# Patient Record
Sex: FEMALE | Race: BLACK OR AFRICAN AMERICAN | NOT HISPANIC OR LATINO | URBAN - METROPOLITAN AREA
[De-identification: names, ages, dates, MRNs, and addresses within clinical notes are randomized per-mention and may not be internally consistent; named-entity substitution may affect disease eponyms.]

---

## 2019-03-10 ENCOUNTER — EMERGENCY (EMERGENCY)
Facility: HOSPITAL | Age: 49
LOS: 1 days | Discharge: HOME | End: 2019-03-10
Attending: EMERGENCY MEDICINE | Admitting: EMERGENCY MEDICINE
Payer: COMMERCIAL

## 2019-03-10 VITALS
DIASTOLIC BLOOD PRESSURE: 85 MMHG | SYSTOLIC BLOOD PRESSURE: 144 MMHG | OXYGEN SATURATION: 100 % | RESPIRATION RATE: 16 BRPM | TEMPERATURE: 98 F | HEART RATE: 70 BPM

## 2019-03-10 DIAGNOSIS — Z98.891 HISTORY OF UTERINE SCAR FROM PREVIOUS SURGERY: Chronic | ICD-10-CM

## 2019-03-10 LAB
ALBUMIN SERPL ELPH-MCNC: 4.7 G/DL — SIGNIFICANT CHANGE UP (ref 3.3–5)
ALP SERPL-CCNC: 59 U/L — SIGNIFICANT CHANGE UP (ref 40–120)
ALT FLD-CCNC: 13 U/L — SIGNIFICANT CHANGE UP (ref 4–33)
ANION GAP SERPL CALC-SCNC: 11 MMO/L — SIGNIFICANT CHANGE UP (ref 7–14)
AST SERPL-CCNC: 18 U/L — SIGNIFICANT CHANGE UP (ref 4–32)
BASOPHILS # BLD AUTO: 0.02 K/UL — SIGNIFICANT CHANGE UP (ref 0–0.2)
BASOPHILS NFR BLD AUTO: 0.2 % — SIGNIFICANT CHANGE UP (ref 0–2)
BILIRUB SERPL-MCNC: 0.5 MG/DL — SIGNIFICANT CHANGE UP (ref 0.2–1.2)
BUN SERPL-MCNC: 6 MG/DL — LOW (ref 7–23)
CALCIUM SERPL-MCNC: 10.1 MG/DL — SIGNIFICANT CHANGE UP (ref 8.4–10.5)
CHLORIDE SERPL-SCNC: 104 MMOL/L — SIGNIFICANT CHANGE UP (ref 98–107)
CO2 SERPL-SCNC: 26 MMOL/L — SIGNIFICANT CHANGE UP (ref 22–31)
CREAT SERPL-MCNC: 0.65 MG/DL — SIGNIFICANT CHANGE UP (ref 0.5–1.3)
EOSINOPHIL # BLD AUTO: 0.15 K/UL — SIGNIFICANT CHANGE UP (ref 0–0.5)
EOSINOPHIL NFR BLD AUTO: 1.5 % — SIGNIFICANT CHANGE UP (ref 0–6)
GLUCOSE SERPL-MCNC: 92 MG/DL — SIGNIFICANT CHANGE UP (ref 70–99)
HCT VFR BLD CALC: 41.8 % — SIGNIFICANT CHANGE UP (ref 34.5–45)
HGB BLD-MCNC: 14 G/DL — SIGNIFICANT CHANGE UP (ref 11.5–15.5)
IMM GRANULOCYTES NFR BLD AUTO: 0.3 % — SIGNIFICANT CHANGE UP (ref 0–1.5)
LIDOCAIN IGE QN: 29.6 U/L — SIGNIFICANT CHANGE UP (ref 7–60)
LYMPHOCYTES # BLD AUTO: 1.96 K/UL — SIGNIFICANT CHANGE UP (ref 1–3.3)
LYMPHOCYTES # BLD AUTO: 19.8 % — SIGNIFICANT CHANGE UP (ref 13–44)
MCHC RBC-ENTMCNC: 27.5 PG — SIGNIFICANT CHANGE UP (ref 27–34)
MCHC RBC-ENTMCNC: 33.5 % — SIGNIFICANT CHANGE UP (ref 32–36)
MCV RBC AUTO: 82 FL — SIGNIFICANT CHANGE UP (ref 80–100)
MONOCYTES # BLD AUTO: 0.64 K/UL — SIGNIFICANT CHANGE UP (ref 0–0.9)
MONOCYTES NFR BLD AUTO: 6.5 % — SIGNIFICANT CHANGE UP (ref 2–14)
NEUTROPHILS # BLD AUTO: 7.11 K/UL — SIGNIFICANT CHANGE UP (ref 1.8–7.4)
NEUTROPHILS NFR BLD AUTO: 71.7 % — SIGNIFICANT CHANGE UP (ref 43–77)
NRBC # FLD: 0 K/UL — LOW (ref 25–125)
PLATELET # BLD AUTO: 253 K/UL — SIGNIFICANT CHANGE UP (ref 150–400)
PMV BLD: 10.7 FL — SIGNIFICANT CHANGE UP (ref 7–13)
POTASSIUM SERPL-MCNC: 4 MMOL/L — SIGNIFICANT CHANGE UP (ref 3.5–5.3)
POTASSIUM SERPL-SCNC: 4 MMOL/L — SIGNIFICANT CHANGE UP (ref 3.5–5.3)
PROT SERPL-MCNC: 8.7 G/DL — HIGH (ref 6–8.3)
RBC # BLD: 5.1 M/UL — SIGNIFICANT CHANGE UP (ref 3.8–5.2)
RBC # FLD: 13.1 % — SIGNIFICANT CHANGE UP (ref 10.3–14.5)
SODIUM SERPL-SCNC: 141 MMOL/L — SIGNIFICANT CHANGE UP (ref 135–145)
WBC # BLD: 9.91 K/UL — SIGNIFICANT CHANGE UP (ref 3.8–10.5)
WBC # FLD AUTO: 9.91 K/UL — SIGNIFICANT CHANGE UP (ref 3.8–10.5)

## 2019-03-10 PROCEDURE — 99284 EMERGENCY DEPT VISIT MOD MDM: CPT | Mod: 25

## 2019-03-10 PROCEDURE — 74177 CT ABD & PELVIS W/CONTRAST: CPT | Mod: 26

## 2019-03-10 NOTE — ED PROVIDER NOTE - NSFOLLOWUPCLINICS_GEN_ALL_ED_FT
[facility] Gastroenterology  Gastroenterology  [address] [place] [id number]  Phone: [phone]  Fax:   Follow Up Time:

## 2019-08-01 ENCOUNTER — TRANSCRIPTION ENCOUNTER (OUTPATIENT)
Age: 49
End: 2019-08-01

## 2020-03-09 ENCOUNTER — INPATIENT (INPATIENT)
Facility: HOSPITAL | Age: 50
LOS: 0 days | Discharge: HOME | End: 2020-03-10
Attending: INTERNAL MEDICINE | Admitting: INTERNAL MEDICINE
Payer: COMMERCIAL

## 2020-03-09 VITALS
DIASTOLIC BLOOD PRESSURE: 107 MMHG | OXYGEN SATURATION: 100 % | RESPIRATION RATE: 18 BRPM | HEART RATE: 104 BPM | SYSTOLIC BLOOD PRESSURE: 161 MMHG

## 2020-03-09 DIAGNOSIS — R41.82 ALTERED MENTAL STATUS, UNSPECIFIED: ICD-10-CM

## 2020-03-09 DIAGNOSIS — Z65.8 OTHER SPECIFIED PROBLEMS RELATED TO PSYCHOSOCIAL CIRCUMSTANCES: ICD-10-CM

## 2020-03-09 DIAGNOSIS — Z98.891 HISTORY OF UTERINE SCAR FROM PREVIOUS SURGERY: Chronic | ICD-10-CM

## 2020-03-09 DIAGNOSIS — Z29.9 ENCOUNTER FOR PROPHYLACTIC MEASURES, UNSPECIFIED: ICD-10-CM

## 2020-03-09 LAB
ALBUMIN SERPL ELPH-MCNC: 4.7 G/DL — SIGNIFICANT CHANGE UP (ref 3.3–5)
ALP SERPL-CCNC: 43 U/L — SIGNIFICANT CHANGE UP (ref 40–120)
ALT FLD-CCNC: 11 U/L — SIGNIFICANT CHANGE UP (ref 4–33)
ANION GAP SERPL CALC-SCNC: 17 MMO/L — HIGH (ref 7–14)
APAP SERPL-MCNC: < 15 UG/ML — LOW (ref 15–25)
APPEARANCE UR: CLEAR — SIGNIFICANT CHANGE UP
APTT BLD: 23.2 SEC — LOW (ref 27.5–36.3)
AST SERPL-CCNC: 18 U/L — SIGNIFICANT CHANGE UP (ref 4–32)
B PERT DNA SPEC QL NAA+PROBE: NOT DETECTED — SIGNIFICANT CHANGE UP
BACTERIA # UR AUTO: NEGATIVE — SIGNIFICANT CHANGE UP
BASE EXCESS BLDV CALC-SCNC: -0.3 MMOL/L — SIGNIFICANT CHANGE UP
BASOPHILS # BLD AUTO: 0.03 K/UL — SIGNIFICANT CHANGE UP (ref 0–0.2)
BASOPHILS NFR BLD AUTO: 0.3 % — SIGNIFICANT CHANGE UP (ref 0–2)
BILIRUB SERPL-MCNC: 1.1 MG/DL — SIGNIFICANT CHANGE UP (ref 0.2–1.2)
BILIRUB UR-MCNC: NEGATIVE — SIGNIFICANT CHANGE UP
BLOOD GAS VENOUS - CREATININE: 0.82 MG/DL — SIGNIFICANT CHANGE UP (ref 0.5–1.3)
BLOOD GAS VENOUS - FIO2: 21 — SIGNIFICANT CHANGE UP
BLOOD UR QL VISUAL: NEGATIVE — SIGNIFICANT CHANGE UP
BUN SERPL-MCNC: 26 MG/DL — HIGH (ref 7–23)
C PNEUM DNA SPEC QL NAA+PROBE: NOT DETECTED — SIGNIFICANT CHANGE UP
CALCIUM SERPL-MCNC: 10.4 MG/DL — SIGNIFICANT CHANGE UP (ref 8.4–10.5)
CHLORIDE BLDV-SCNC: 109 MMOL/L — HIGH (ref 96–108)
CHLORIDE SERPL-SCNC: 105 MMOL/L — SIGNIFICANT CHANGE UP (ref 98–107)
CO2 SERPL-SCNC: 21 MMOL/L — LOW (ref 22–31)
COLOR SPEC: YELLOW — SIGNIFICANT CHANGE UP
CREAT SERPL-MCNC: 0.71 MG/DL — SIGNIFICANT CHANGE UP (ref 0.5–1.3)
EOSINOPHIL # BLD AUTO: 0.02 K/UL — SIGNIFICANT CHANGE UP (ref 0–0.5)
EOSINOPHIL NFR BLD AUTO: 0.2 % — SIGNIFICANT CHANGE UP (ref 0–6)
ETHANOL BLD-MCNC: < 10 MG/DL — SIGNIFICANT CHANGE UP
FLUAV H1 2009 PAND RNA SPEC QL NAA+PROBE: NOT DETECTED — SIGNIFICANT CHANGE UP
FLUAV H1 RNA SPEC QL NAA+PROBE: NOT DETECTED — SIGNIFICANT CHANGE UP
FLUAV H3 RNA SPEC QL NAA+PROBE: NOT DETECTED — SIGNIFICANT CHANGE UP
FLUAV SUBTYP SPEC NAA+PROBE: NOT DETECTED — SIGNIFICANT CHANGE UP
FLUBV RNA SPEC QL NAA+PROBE: NOT DETECTED — SIGNIFICANT CHANGE UP
GAS PNL BLDV: 148 MMOL/L — HIGH (ref 136–146)
GLUCOSE BLDC GLUCOMTR-MCNC: 181 MG/DL — HIGH (ref 70–99)
GLUCOSE BLDC GLUCOMTR-MCNC: 94 MG/DL — SIGNIFICANT CHANGE UP (ref 70–99)
GLUCOSE BLDV-MCNC: 200 MG/DL — HIGH (ref 70–99)
GLUCOSE SERPL-MCNC: 208 MG/DL — HIGH (ref 70–99)
GLUCOSE UR-MCNC: 500 — HIGH
HADV DNA SPEC QL NAA+PROBE: NOT DETECTED — SIGNIFICANT CHANGE UP
HCG UR-SCNC: NEGATIVE — SIGNIFICANT CHANGE UP
HCO3 BLDV-SCNC: 23 MMOL/L — SIGNIFICANT CHANGE UP (ref 20–27)
HCOV PNL SPEC NAA+PROBE: SIGNIFICANT CHANGE UP
HCT VFR BLD CALC: 41.2 % — SIGNIFICANT CHANGE UP (ref 34.5–45)
HCT VFR BLDV CALC: 44.2 % — SIGNIFICANT CHANGE UP (ref 34.5–45)
HGB BLD-MCNC: 13.7 G/DL — SIGNIFICANT CHANGE UP (ref 11.5–15.5)
HGB BLDV-MCNC: 14.4 G/DL — SIGNIFICANT CHANGE UP (ref 11.5–15.5)
HMPV RNA SPEC QL NAA+PROBE: NOT DETECTED — SIGNIFICANT CHANGE UP
HPIV1 RNA SPEC QL NAA+PROBE: NOT DETECTED — SIGNIFICANT CHANGE UP
HPIV2 RNA SPEC QL NAA+PROBE: NOT DETECTED — SIGNIFICANT CHANGE UP
HPIV3 RNA SPEC QL NAA+PROBE: NOT DETECTED — SIGNIFICANT CHANGE UP
HPIV4 RNA SPEC QL NAA+PROBE: NOT DETECTED — SIGNIFICANT CHANGE UP
HYALINE CASTS # UR AUTO: NEGATIVE — SIGNIFICANT CHANGE UP
IMM GRANULOCYTES NFR BLD AUTO: 0.2 % — SIGNIFICANT CHANGE UP (ref 0–1.5)
INR BLD: 1.19 — HIGH (ref 0.88–1.17)
KETONES UR-MCNC: HIGH
LACTATE BLDV-MCNC: 1.6 MMOL/L — SIGNIFICANT CHANGE UP (ref 0.5–2)
LEUKOCYTE ESTERASE UR-ACNC: NEGATIVE — SIGNIFICANT CHANGE UP
LYMPHOCYTES # BLD AUTO: 1.04 K/UL — SIGNIFICANT CHANGE UP (ref 1–3.3)
LYMPHOCYTES # BLD AUTO: 12.1 % — LOW (ref 13–44)
MAGNESIUM SERPL-MCNC: 2.2 MG/DL — SIGNIFICANT CHANGE UP (ref 1.6–2.6)
MCHC RBC-ENTMCNC: 27.3 PG — SIGNIFICANT CHANGE UP (ref 27–34)
MCHC RBC-ENTMCNC: 33.3 % — SIGNIFICANT CHANGE UP (ref 32–36)
MCV RBC AUTO: 82.1 FL — SIGNIFICANT CHANGE UP (ref 80–100)
MONOCYTES # BLD AUTO: 0.57 K/UL — SIGNIFICANT CHANGE UP (ref 0–0.9)
MONOCYTES NFR BLD AUTO: 6.6 % — SIGNIFICANT CHANGE UP (ref 2–14)
NEUTROPHILS # BLD AUTO: 6.92 K/UL — SIGNIFICANT CHANGE UP (ref 1.8–7.4)
NEUTROPHILS NFR BLD AUTO: 80.6 % — HIGH (ref 43–77)
NITRITE UR-MCNC: NEGATIVE — SIGNIFICANT CHANGE UP
NRBC # FLD: 0 K/UL — SIGNIFICANT CHANGE UP (ref 0–0)
PCO2 BLDV: 42 MMHG — SIGNIFICANT CHANGE UP (ref 41–51)
PH BLDV: 7.38 PH — SIGNIFICANT CHANGE UP (ref 7.32–7.43)
PH UR: 6 — SIGNIFICANT CHANGE UP (ref 5–8)
PLATELET # BLD AUTO: 244 K/UL — SIGNIFICANT CHANGE UP (ref 150–400)
PMV BLD: 10.9 FL — SIGNIFICANT CHANGE UP (ref 7–13)
PO2 BLDV: 33 MMHG — LOW (ref 35–40)
POTASSIUM BLDV-SCNC: 3.3 MMOL/L — LOW (ref 3.4–4.5)
POTASSIUM SERPL-MCNC: 3.4 MMOL/L — LOW (ref 3.5–5.3)
POTASSIUM SERPL-SCNC: 3.4 MMOL/L — LOW (ref 3.5–5.3)
PROT SERPL-MCNC: 9.3 G/DL — HIGH (ref 6–8.3)
PROT UR-MCNC: 50 — SIGNIFICANT CHANGE UP
PROTHROM AB SERPL-ACNC: 13.6 SEC — HIGH (ref 9.8–13.1)
RBC # BLD: 5.02 M/UL — SIGNIFICANT CHANGE UP (ref 3.8–5.2)
RBC # FLD: 13.1 % — SIGNIFICANT CHANGE UP (ref 10.3–14.5)
RBC CASTS # UR COMP ASSIST: SIGNIFICANT CHANGE UP (ref 0–?)
RSV RNA SPEC QL NAA+PROBE: NOT DETECTED — SIGNIFICANT CHANGE UP
RV+EV RNA SPEC QL NAA+PROBE: NOT DETECTED — SIGNIFICANT CHANGE UP
SALICYLATES SERPL-MCNC: < 5 MG/DL — LOW (ref 15–30)
SAO2 % BLDV: 58.5 % — LOW (ref 60–85)
SODIUM SERPL-SCNC: 143 MMOL/L — SIGNIFICANT CHANGE UP (ref 135–145)
SP GR SPEC: 1.02 — SIGNIFICANT CHANGE UP (ref 1–1.04)
SQUAMOUS # UR AUTO: SIGNIFICANT CHANGE UP
TROPONIN T, HIGH SENSITIVITY: 6 NG/L — SIGNIFICANT CHANGE UP (ref ?–14)
TSH SERPL-MCNC: 0.95 UIU/ML — SIGNIFICANT CHANGE UP (ref 0.27–4.2)
UROBILINOGEN FLD QL: NORMAL — SIGNIFICANT CHANGE UP
WBC # BLD: 8.6 K/UL — SIGNIFICANT CHANGE UP (ref 3.8–10.5)
WBC # FLD AUTO: 8.6 K/UL — SIGNIFICANT CHANGE UP (ref 3.8–10.5)
WBC UR QL: SIGNIFICANT CHANGE UP (ref 0–?)

## 2020-03-09 PROCEDURE — 99223 1ST HOSP IP/OBS HIGH 75: CPT | Mod: GC

## 2020-03-09 PROCEDURE — 71045 X-RAY EXAM CHEST 1 VIEW: CPT | Mod: 26

## 2020-03-09 PROCEDURE — 70450 CT HEAD/BRAIN W/O DYE: CPT | Mod: 26

## 2020-03-09 PROCEDURE — 99222 1ST HOSP IP/OBS MODERATE 55: CPT

## 2020-03-09 RX ORDER — DEXTROSE 50 % IN WATER 50 %
50 SYRINGE (ML) INTRAVENOUS ONCE
Refills: 0 | Status: COMPLETED | OUTPATIENT
Start: 2020-03-09 | End: 2020-03-09

## 2020-03-09 RX ADMIN — Medication 50 MILLILITER(S): at 20:40

## 2020-03-09 RX ADMIN — Medication 1 MILLIGRAM(S): at 19:15
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
## 2020-03-09 NOTE — ED PROVIDER NOTE - OBJECTIVE STATEMENT
49 y F no stated pm  pw altered mental status  was noted by family no be unresponsive this am around 0930  they tried to arrouse her but she did not respond  ems was later called in the early afternoon  found to have bgm 62, which responded to d50  however, AMS did not improve  no history of fever or substance abuse as per family  unable to obtain history from patient

## 2020-03-09 NOTE — H&P ADULT - ATTENDING COMMENTS
49W no significant PMHx p/w sudden unresponsiveness. CTH without acute pathology. Labs without significant abnormalities. On exam, she resists opening her eyelids. When arm is raised over her head, she maintains her arm in the air. Suspect conversion disorder vs catatonia. Neuro recs reviewed. Will obtain MRI brain, MRA head and neck, LP. Will also trial IV Ativan.    I examined this patient and discussed their management with house staff on 3/9/2020.

## 2020-03-09 NOTE — ED ADULT TRIAGE NOTE - CHIEF COMPLAINT QUOTE
pt new onset AMS at 9am. f/s in field 62, given 1amp D50 by EMS. pt currently lethargic, responds to sternal rub only.

## 2020-03-09 NOTE — H&P ADULT - NSHPPHYSICALEXAM_GEN_ALL_CORE
GENERAL: NAD, lying in bed comfortably  HEAD:  Atraumatic, Normocephalic  EYES: EOMI, PERRLA, conjunctiva and sclera clear  ENT: Moist mucous membranes  NECK: Supple, No JVD  CHEST/LUNG: CTA YG   HEART: Regular rate and rhythm; No murmurs, rubs, or gallops  ABDOMEN: Bowel sounds present; Soft, Nontender, Nondistended. No hepatomegally  EXTREMITIES:  2+ Peripheral Pulses, brisk capillary refill. No clubbing, cyanosis, or edema  NERVOUS SYSTEM:  unable to assess mental status, patient non responsive    MSK: patient with normal muscle tone, no rigidity, clonus, unable to assess strength  SKIN: No rashes or lesions

## 2020-03-09 NOTE — H&P ADULT - ASSESSMENT
49F no significant PMHx p/w altered mental status, minimally responsive since 3/9/20 AM, with stable vital signs, labs within normal limits, negative CT head, admitted for further observation and evaluation for evaluation of encephalopathy vs. psychogenic etiology of AMS.

## 2020-03-09 NOTE — ED PROVIDER NOTE - ATTENDING CONTRIBUTION TO CARE
49F presents with altered mental status. This morning the patient was found in the basement by her mom, tried to wake her without success. By family report, she was at her normal baseline around 7am, then noted to be altered at 9am. Per EMS, arousable to sternal rub. FSBG 61, given D50 without a change in mental status. Patient not able to give a hx. Per sister, patient has no medical history, does not take any meds, no psychiatric hx, does not drink or use drugs. No prior h/o similar episode. On exam well appearing, nad, resists eye opening, mmm, rrr, lungs CTA b/l, abd soft NT/ND, 2+ pulses, no edema, no rash, alert, MAEE, not speaking, follows simple commands and nods head in response to some question.

## 2020-03-09 NOTE — H&P ADULT - PROBLEM SELECTOR PLAN 1
Patient with sudden change in mental status concerning for infectious vs metabolic vs psychogenic etiology.   -f/u MRA head, MRA neck   -f/u LP   -f/u utox, folate, B12  -neuro consulted, recs appreciated  -routine EEG  -psych consult in AM   -q4 neurochecks Patient with sudden change in mental status concerning for infectious vs metabolic vs psychogenic etiology.   -f/u MRA head, MRA neck   -f/u LP   -f/u utox, folate, B12  -neuro consulted, recs appreciated  -routine EEG  -psych consult in AM   -q4 neurochecks  -trial of IV ativan for possible catatonia given waxy flexibility in setting of home stressor

## 2020-03-09 NOTE — CONSULT NOTE ADULT - SUBJECTIVE AND OBJECTIVE BOX
HPI:  Patient is a 49 year old female with no significant PMHx who presents to the ED for AMS.     PAST MEDICAL & SURGICAL HISTORY:  No pertinent past medical history  History of     General:  Constitutional: Obese Female, appears stated age, in no apparent distress including pain  Head: Normocephalic & atraumatic.    Neurological (>12):  MS: Awake, alert, oriented to person, place, situation, time. Normal affect. Follows all commands.    Language: Speech is clear, fluent with good repetition & comprehension (able to name objects___)    CNs: PERRLA, VFF. EOMI no nystagmus, no diplopia. V1-3 intact to LT. No facial asymmetry b/l, full eye closure strength b/l. Hearing grossly normal. Head turning & shoulder shrug intact b/l. Tongue midline    Motor: Normal muscle bulk & tone. No noticeable tremor or seizure. No pronator drift.              Deltoid	Biceps	Triceps	   R	5	5	5	5	  L	5	5	5	5	    	H-Flex	H-Ext	K-Flex	K-Ext	D-Flex	P-Flex  R	5	5	5	5	5	5		   L	5	5	5	5	5	5	     Sensation: Intact to LT/PP/Temp/Vibration/Position b/l throughout.     Cortical: Extinction on DSS (neglect): none    Reflexes:              Biceps(C5)       BR(C6)     Triceps(C7)               Patellar(L4)    Achilles(S1)    Plantar Resp  R	2	          2	             2		        2		    2		Down   L	2	          2	             2		        2		    2		Down     Coordination: intact rapid-alt movements. No dysmetria to FTN/HTS  Gait: Normal Romberg. No postural instability. Normal stance and tandem gait.     LABORATORY:  CBC                       13.7   8.60  )-----------( 244      ( 09 Mar 2020 14:45 )             41.2     Chem     143  |  105  |  26<H>  ----------------------------<  208<H>  3.4<L>   |  21<L>  |  0.71    Ca    10.4      09 Mar 2020 14:45  Mg     2.2         TPro  9.3<H>  /  Alb  4.7  /  TBili  1.1  /  DBili  x   /  AST  18  /  ALT  11  /  AlkPhos  43      LFTs LIVER FUNCTIONS - ( 09 Mar 2020 14:45 )  Alb: 4.7 g/dL / Pro: 9.3 g/dL / ALK PHOS: 43 u/L / ALT: 11 u/L / AST: 18 u/L / GGT: x HPI:  Patient is a 49 year old female with no significant PMHx who presents to the ED for AMS. History obtained from family at bedside since patient is having waxing and waning mentation (currently is mute, however 10-15 min prior to eval was speaking and AOx3). She was last seen well on the AM of presentation, and at approx 9:30 am she was found in the living room sitting on the couch, with her eyes closed and was not responsive. She came to after several minutes (opened eyes) and family called EMS. POCT in the field was 62, and she got d50 and fingerstick improved. She has no history of anything like this occurring, and at her baseline is functional, ambulatory, and does all iADLs. She has no history of substance abuse, no new medications, no stressful events per the family, no history of seizures. No history of recent illnesses or travel, no head trauma. Per ED she has said earlier that she can't remember events from past 2 days but "something happened". CT head in ED unremarkable, CBC, CMP unremarkable. TSH wnl 0.95. RVP and blood cx x2 pending. LP pending.    PAST MEDICAL & SURGICAL HISTORY:  No pertinent past medical history  History of     General:  Constitutional: Female, appears stated age, in no apparent distress including pain  Head: Normocephalic & atraumatic.    Neurological (>12):  MS: Awake, lethargic but easily arousable, able to open eyes but has flat affect, appears catatonic. Able to follow commands but has slowed movement throughout, able to follow cross commands, (touch L ear with R hand and stick out tongue), able to show 2 fingers. Unable to say name, where she is or what the year is.  Neck is supple, no nuchal rigidity.   Language: no notable verbal output, mumbled 1-2 incoherent words.     CNs: PERRLA, unable to assess VF, unable to assess for lid lag (patient closes eyes after few seconds) EOM grossly intact, no nystagmus, sensation intact (nods yes), No facial asymmetry. Tongue midline    Motor: Normal muscle bulk & tone. No noticeable tremor. No pronator drift. Strength limited - likely effort dependent.               Deltoid	Biceps	Triceps	   R	4	4	4	4	  L	4	4	4	4	    	H-Flex	H-Ext	K-Flex	K-Ext	D-Flex	P-Flex  R	4+	4+	4+	4+	5	5		   L	4+	4+	4+	4+	5	5	     Sensation: Intact to LT throughout.   Cortical: Extinction on DSS (neglect): cannot assess    Reflexes:              Biceps(C5)       BR(C6)     Triceps(C7)               Patellar(L4)    Achilles(S1)    Plantar Resp  R	2	          2	             2		        2		    2		mute  L	2	          2	             2		        2		    2		mute     Coordination: no dysmetria  Gait: able to walk with assistance, takes small cautious steps.     LABORATORY:  CBC                       13.7   8.60  )-----------( 244      ( 09 Mar 2020 14:45 )             41.2     Chem 03-    143  |  105  |  26<H>  ----------------------------<  208<H>  3.4<L>   |  21<L>  |  0.71    Ca    10.4      09 Mar 2020 14:45  Mg     2.2     -    TPro  9.3<H>  /  Alb  4.7  /  TBili  1.1  /  DBili  x   /  AST  18  /  ALT  11  /  AlkPhos  43  03-09    LFTs LIVER FUNCTIONS - ( 09 Mar 2020 14:45 )  Alb: 4.7 g/dL / Pro: 9.3 g/dL / ALK PHOS: 43 u/L / ALT: 11 u/L / AST: 18 u/L / GGT: x

## 2020-03-09 NOTE — ED PROVIDER NOTE - PROGRESS NOTE DETAILS
sign out:  -infectious/tox workup  -neuro imaging; rule out hypothyroid; will require admission for ams; rectal temp, if febrile would cover with abx Khadar PGY3: signout received from Tox fellow MD pt with no PMH/past pysch hx with AMS today in setting of hypoglycemia, ams persisted after hypoglycemia resolved, was catatonic A&Ox0 however otherwise non-focal neuro exam per intial documented exam. CTH and labs and tox wnl. on initial assessment pt arousable to voice and sound however mumbling in response to stimuli not following commands. after returning from CTH pt awake alert A&Ox3. states she can't remember events from past 2 days but thinks "something happened" denies head trauma/drugs/alcohol/depression/anxiety/SI/HI/hallucinations. psych called recommended to consult neuro first. neuro consulted will see pt in ED Khadar PGY3: on reassessment pt awake and alert however not responding verbally to questions A&Ox0, neuro saw pt recommends admission for MRI and LP psych said pysch should be consulted inpatient once pt is medically cleared family still deliberating over LP will admit so far w/u wnl Khadar PGY3: spoke with family approximately 6 times since my arrival and initial assessment of pt. unable to reach pt's spouse - family doesn't know spouse's phone number and it's not in [place]. pt unable to give spouse's number. daughter present who is 18 y/o had said she wanted to speak to other family members before deciding on LP. on reassessment family still decidiing on LP or not Khadar BARNARD: other daughter now at bedside states family has still not made a decision regarding consent for LP

## 2020-03-09 NOTE — ED ADULT NURSE NOTE - OBJECTIVE STATEMENT
Pt received to room 1. Pt was brought in by EMS after found by family around 9am with AMS. on EMS arrival pts FS was 62. Pt was given 1 amp of D50. Pt lethargic, opens eyes when you ask questions but does not respond. Pt arrives with IV in L AC placed by EMS. As per family pt does not do drugs denies HX. Pt placed on cardiac monitor, IV access obtained, labs drawn and sent.

## 2020-03-09 NOTE — CONSULT NOTE ADULT - ATTENDING COMMENTS
I have personally seen, examined, and participated in the care of this patient on rounds 3/10/20 with the neurology team.  I have reviewed all pertinent clinical information, including history, physical examination, assessment and plan.   Hx as reported above.  Additional information: works as an ; no previous catatonic-like episodes or other psychiatric problems.      On examination I note in particular, or in addition to or instead of the above, as follows:    Pt awake, cheerful.  When asked what happened/why is she in hospital she responds that she thinks was reacting to stress but now feels fine.  Knows date, location, recent news items, names of major elected officials.  Responds to examination instructions quickly and accurately.  Normal comprehension, expression, prosody, speech articulation.  PERRL; EOMI; confrontation visual fields intact.  No UE drift.  April symmetric and intact four limbs.  Normal brisk gait, and walking on toes/heels. I have personally seen, examined, and participated in the care of this patient on rounds 3/10/20 with the neurology team.  I have reviewed all pertinent clinical information, including history, physical examination, assessment and plan.   Hx as reported above.  Additional information: works as an ; no previous catatonic-like episodes or other psychiatric problems.      On examination I note in particular, or in addition to or instead of the above, as follows:    Pt awake, cheerful.  When asked what happened/why is she in hospital she responds that she thinks was reacting to stress but now feels fine.  Knows date, location, recent news items, names of major elected officials.  Responds to examination instructions quickly and accurately.  Normal comprehension, expression, prosody, speech articulation.  PERRL; EOMI; confrontation visual fields intact.  No UE drift.  April symmetric and intact four limbs.  Normal brisk gait, and walking on toes/heels.      IMPRESSION    Acute stress reaction with catatonic-like symptoms, resolved.      Normal mentals status and general neurologic examination.      No further work-up needed from the neurologic point of view (including further CNS imaging studies).      Behavioral Medicine out-pt follow-up.

## 2020-03-09 NOTE — ED ADULT NURSE REASSESSMENT NOTE - NS ED NURSE REASSESS COMMENT FT1
Pt received from day RN. Pt opened eyes to name but did not respond to any other questions or open eyes to any other questions. D5 given by myself. Family at bedside. No distress noted. Will continue to monitor.
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
## 2020-03-09 NOTE — ED PROVIDER NOTE - CLINICAL SUMMARY MEDICAL DECISION MAKING FREE TEXT BOX
will ro infectious etiology including encephalitis/menigitis after ct head performed, if imaging study ammenable to LP. Furthermore, we will rule out organic neurologic cause of her ams, but she is outside of the window for TP and has no neuro findings consistent with a specific lesion. Finally, there is no evidence of an acute toxidrome, however sedative hypnotic may be a possibility

## 2020-03-09 NOTE — ED ADULT NURSE REASSESSMENT NOTE - NS ED NURSE REASSESS COMMENT FT1
Received report from EVANGELIST [place]. Upon initial encounter with pt , pt was alert but not answering questions. On arrival back to room to draw lab work, pt now communicating. When asked why she wasn't communicating prior she stated "I have a lot going on". During MD fiore pt denied SI/HI, A/V/H, drug or alcohol use. Will continue to monitor.

## 2020-03-09 NOTE — ED ADULT NURSE NOTE - NSIMPLEMENTINTERV_GEN_ALL_ED
Implemented All Fall Risk Interventions:  [place] to call system. Call bell, personal items and telephone within reach. Instruct patient to call for assistance. Room bathroom lighting operational. Non-slip footwear when patient is off stretcher. Physically safe environment: no spills, clutter or unnecessary equipment. Stretcher in lowest position, wheels locked, appropriate side rails in place. Provide visual cue, wrist band, yellow gown, etc. Monitor gait and stability. Monitor for mental status changes and reorient to person, place, and time. Review medications for side effects contributing to fall risk. Reinforce activity limits and safety measures with patient and family.
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
## 2020-03-09 NOTE — H&P ADULT - NSHPLABSRESULTS_GEN_ALL_CORE
LABS:                        13.7   8.60  )-----------( 244      ( 09 Mar 2020 14:45 )             41.2     03-09    143  |  105  |  26<H>  ----------------------------<  208<H>  3.4<L>   |  21<L>  |  0.71    Ca    10.4      09 Mar 2020 14:45  Mg     2.2     03-09    TPro  9.3<H>  /  Alb  4.7  /  TBili  1.1  /  DBili  x   /  AST  18  /  ALT  11  /  AlkPhos  43  03-09    PT/INR - ( 09 Mar 2020 16:39 )   PT: 13.6 SEC;   INR: 1.19          PTT - ( 09 Mar 2020 16:39 )  PTT:23.2 SEC

## 2020-03-09 NOTE — ED PROVIDER NOTE - PHYSICAL EXAMINATION
CONSTITUTIONAL: non-toxic, no acute distress  HEAD: Normocephalic; atraumatic,   EYES: EOM intact, pupils equal reactive  ENMT: External appears normal; normal oropharynx  NECK: Supple; non-tender; normal ROM  CARD: RRR, no MRG, no cyanosis   RESP: Normal breathing pattern,  normal respiratory rate, CTAB  ABD: Soft, non-distended; non-tender;   EXT: grossly normal ROM in all four extremities   SKIN: Warm, dry, no rash, not diaphoretic   NEURO: CN 2-12 grossly intact, Mental status AAOx0  moving all extrmeities  no hyperreflexia, no clonus

## 2020-03-09 NOTE — CONSULT NOTE ADULT - ASSESSMENT
pended 49 year old female who presents to the ED for AMS. History obtained from family at bedside since patient is having waxing and waning mentation (currently is mute, however 10-15 min prior to eval was speaking and AOx3). She was last seen well on the AM of presentation, and at approx 9:30 am she was found in the living room sitting on the couch, with her eyes closed and was not responsive. She came to after several minutes (opened eyes) and family called EMS. POCT in the field was 62, and she got d50 and fingerstick improved. She has no history of anything like this occurring, and at her baseline is functional, ambulatory, and does all iADLs. She has no history of substance abuse, no new medications, no stressful events per the family, no history of seizures. No history of recent illnesses or travel, no head trauma. Per ED she has said earlier that she can't remember events from past 2 days but "something happened". CT head in ED unremarkable, CBC, CMP unremarkable. TSH wnl 0.95. RVP and blood cx x2 pending. LP pending.  on neuro exam is she is drowsy but easily arousable, not speaking,     Impression: unclear etiology of sudden change in mentation - r/o infectious vs metabolic vs psychiatric etiology    Plan  [] LP in ED pending: send for cell count, protein, glucose, gram stain, culture, PCR panel, acid fast  [] MRI brain w, w/o contrast,   [] MRA head non con, MRA neck with contrast  [] labs: b12, folate, Utox, heavy metal screen  [] routine EEG  [] psych consult  [] further workup pending results of above studies 49 year old female who presents to the ED for AMS. History obtained from family at bedside since patient is having waxing and waning mentation (currently is mute, however 10-15 min prior to eval was speaking and AOx3). She was last seen well on the AM of presentation, and at approx 9:30 am she was found in the living room sitting on the couch, with her eyes closed and was not responsive. She came to after several minutes (opened eyes) and family called EMS. POCT in the field was 62, and she got d50 and fingerstick improved. She has no history of anything like this occurring, and at her baseline is functional, ambulatory, and does all iADLs. She has no history of substance abuse, no new medications, no stressful events per the family, no history of seizures. No history of recent illnesses or travel, no head trauma. Per ED she has said earlier that she can't remember events from past 2 days but "something happened". CT head in ED unremarkable, CBC, CMP unremarkable. TSH wnl 0.95. RVP and blood cx x2 pending. LP pending.  on neuro exam is she is drowsy but easily arousable, not speaking,     Impression: unclear etiology of sudden change in mentation - r/o infectious vs metabolic vs psychiatric etiology    Plan  [] LP in ED pending: send for cell count, protein, glucose, gram stain, culture, PCR panel, acid fast  [] MRI brain w, w/o contrast,   [] MRA head non con, MRA neck with contrast  [] labs: b12, folate, Utox, heavy metal screen  [] routine EEG  [] psych consult  [] EKG  [] further workup pending results of above studies  [] q4 neurochecks, monitor for worsening in neuro exam

## 2020-03-10 ENCOUNTER — TRANSCRIPTION ENCOUNTER (OUTPATIENT)
Age: 50
End: 2020-03-10

## 2020-03-10 VITALS
TEMPERATURE: 98 F | OXYGEN SATURATION: 99 % | HEART RATE: 98 BPM | RESPIRATION RATE: 18 BRPM | DIASTOLIC BLOOD PRESSURE: 76 MMHG | SYSTOLIC BLOOD PRESSURE: 122 MMHG

## 2020-03-10 LAB
ANION GAP SERPL CALC-SCNC: 17 MMO/L — HIGH (ref 7–14)
BUN SERPL-MCNC: 22 MG/DL — SIGNIFICANT CHANGE UP (ref 7–23)
CALCIUM SERPL-MCNC: 10 MG/DL — SIGNIFICANT CHANGE UP (ref 8.4–10.5)
CHLORIDE SERPL-SCNC: 102 MMOL/L — SIGNIFICANT CHANGE UP (ref 98–107)
CO2 SERPL-SCNC: 21 MMOL/L — LOW (ref 22–31)
CREAT SERPL-MCNC: 0.69 MG/DL — SIGNIFICANT CHANGE UP (ref 0.5–1.3)
FOLATE SERPL-MCNC: > 20 NG/ML — HIGH (ref 4.7–20)
GLUCOSE BLDC GLUCOMTR-MCNC: 88 MG/DL — SIGNIFICANT CHANGE UP (ref 70–99)
GLUCOSE SERPL-MCNC: 101 MG/DL — HIGH (ref 70–99)
HCT VFR BLD CALC: 40.6 % — SIGNIFICANT CHANGE UP (ref 34.5–45)
HGB BLD-MCNC: 13.3 G/DL — SIGNIFICANT CHANGE UP (ref 11.5–15.5)
MAGNESIUM SERPL-MCNC: 2.1 MG/DL — SIGNIFICANT CHANGE UP (ref 1.6–2.6)
MCHC RBC-ENTMCNC: 27.3 PG — SIGNIFICANT CHANGE UP (ref 27–34)
MCHC RBC-ENTMCNC: 32.8 % — SIGNIFICANT CHANGE UP (ref 32–36)
MCV RBC AUTO: 83.4 FL — SIGNIFICANT CHANGE UP (ref 80–100)
NRBC # FLD: 0 K/UL — SIGNIFICANT CHANGE UP (ref 0–0)
PHOSPHATE SERPL-MCNC: 3.7 MG/DL — SIGNIFICANT CHANGE UP (ref 2.5–4.5)
PLATELET # BLD AUTO: 231 K/UL — SIGNIFICANT CHANGE UP (ref 150–400)
PMV BLD: 11.1 FL — SIGNIFICANT CHANGE UP (ref 7–13)
POTASSIUM SERPL-MCNC: 3.1 MMOL/L — LOW (ref 3.5–5.3)
POTASSIUM SERPL-SCNC: 3.1 MMOL/L — LOW (ref 3.5–5.3)
RBC # BLD: 4.87 M/UL — SIGNIFICANT CHANGE UP (ref 3.8–5.2)
RBC # FLD: 13.2 % — SIGNIFICANT CHANGE UP (ref 10.3–14.5)
SODIUM SERPL-SCNC: 140 MMOL/L — SIGNIFICANT CHANGE UP (ref 135–145)
VIT B12 SERPL-MCNC: > 2000 PG/ML — HIGH (ref 200–900)
WBC # BLD: 9.84 K/UL — SIGNIFICANT CHANGE UP (ref 3.8–10.5)
WBC # FLD AUTO: 9.84 K/UL — SIGNIFICANT CHANGE UP (ref 3.8–10.5)

## 2020-03-10 PROCEDURE — 99239 HOSP IP/OBS DSCHRG MGMT >30: CPT | Mod: GC

## 2020-03-10 RX ORDER — DEXTROSE 50 % IN WATER 50 %
25 SYRINGE (ML) INTRAVENOUS ONCE
Refills: 0 | Status: DISCONTINUED | OUTPATIENT
Start: 2020-03-10 | End: 2020-03-10

## 2020-03-10 RX ORDER — DEXTROSE 50 % IN WATER 50 %
12.5 SYRINGE (ML) INTRAVENOUS ONCE
Refills: 0 | Status: DISCONTINUED | OUTPATIENT
Start: 2020-03-10 | End: 2020-03-10

## 2020-03-10 RX ORDER — SODIUM CHLORIDE 9 MG/ML
1000 INJECTION, SOLUTION INTRAVENOUS
Refills: 0 | Status: DISCONTINUED | OUTPATIENT
Start: 2020-03-10 | End: 2020-03-10

## 2020-03-10 RX ORDER — GLUCAGON INJECTION, SOLUTION 0.5 MG/.1ML
1 INJECTION, SOLUTION SUBCUTANEOUS ONCE
Refills: 0 | Status: DISCONTINUED | OUTPATIENT
Start: 2020-03-10 | End: 2020-03-10

## 2020-03-10 RX ORDER — INSULIN LISPRO 100/ML
VIAL (ML) SUBCUTANEOUS EVERY 6 HOURS
Refills: 0 | Status: DISCONTINUED | OUTPATIENT
Start: 2020-03-10 | End: 2020-03-10

## 2020-03-10 RX ORDER — DEXTROSE 50 % IN WATER 50 %
15 SYRINGE (ML) INTRAVENOUS ONCE
Refills: 0 | Status: DISCONTINUED | OUTPATIENT
Start: 2020-03-10 | End: 2020-03-10

## 2020-03-10 NOTE — DISCHARGE NOTE PROVIDER - NSFOLLOWUPCLINICS_GEN_ALL_ED_FT
[facility]  Psychiatry  [address] [place] [address]  Phone: [phone]  Fax:   Follow Up Time: Routine

## 2020-03-10 NOTE — DISCHARGE NOTE PROVIDER - NSDCCPCAREPLAN_GEN_ALL_CORE_FT
PRINCIPAL DISCHARGE DIAGNOSIS  Diagnosis: Altered mental status  Assessment and Plan of Treatment: You were found to have altered mental status, in which you were minimally responsive to verbal stimuli. Your vital signs were normal. Your symptoms spontaneously resolved. It is likely that this was a manifestation of stress. You were evaluated by neurology who did not see need for further workup. You should call your doctor or return to the ED if you have similar symptoms or feel otherwise unwell.      SECONDARY DISCHARGE DIAGNOSES  Diagnosis: Domestic problems  Assessment and Plan of Treatment: On interview, it appeared that some of your stress may stem from issues at home. You were given the name and number of a [facility] crisis center. please follow up with them or your primary care doctor as needed for further help with stress in your life. Please notify them or return to the ED if you have thoughts of harming yourself or others.

## 2020-03-10 NOTE — DISCHARGE NOTE PROVIDER - HOSPITAL COURSE
49F no known PMHx, p/w altered mental status, brought in by EMS. Per sister, patient was at her mother's house this morning; patient found to be sitting, unresponsive to verbal or physical stimulus, brought to [facility] ED by EMS. Patient intermittently responsive to sternal rub, intermittently responsive to questioning from family; however, marked paucity of words when responsive. In the ED, patient found to have blood glucose of 62, which was corrected with D50. /107, , RR 18, saturating 100% on RA. CT head negative for intracranial pathology.         Patient admitted to medicine with initial plans for further workup of altered mental status. Patient with spontaneous resolution of symptoms 3/10 am. Patient AAOx4, without focal neurological deficits. Patient states she is feeling her normal self and believes her altered mental status may have been as a result of stress in her life.         Further questioning revealed concerns about her relationship with . Patient denies domestic abuse.         Patient to f/u with her outpatient PMD. Patient given referral to [facility]. 49W no known PMHx, p/w altered mental status, brought in by EMS. Per sister, patient was at her mother's house this morning; patient found to be sitting, unresponsive to verbal or physical stimulus, brought to [facility] ED by EMS. Patient intermittently responsive to sternal rub, intermittently responsive to questioning from family; however, marked paucity of words when responsive. In the ED, patient found to have blood glucose of 62, which was corrected with D50. /107, , RR 18, saturating 100% on RA. CT head negative for intracranial pathology.         Patient admitted to medicine with initial plans for further workup of altered mental status. Patient with spontaneous resolution of symptoms 3/10 am. Patient AAOx4, without focal neurological deficits. Patient states she is feeling her normal self and believes her altered mental status may have been as a result of stress in her life. Concerning for potential catatonia.        Further questioning revealed concerns about her relationship with . Patient denies domestic abuse.         Patient to f/u with her outpatient PMD. Patient given referral to [facility].

## 2020-03-10 NOTE — PROGRESS NOTE ADULT - SUBJECTIVE AND OBJECTIVE BOX
[facility] Division of Hospital Medicine  Vic Damon MD PGY-1  Pager: [phone]      Patient is a 49y old  Female who presents with a chief complaint of AMS (09 Mar 2020 19:11)      SUBJECTIVE / OVERNIGHT EVENTS:    MEDICATIONS  (STANDING):  dextrose 5%. 1000 milliLiter(s) (50 mL/Hr) IV Continuous <Continuous>  dextrose 50% Injectable 12.5 Gram(s) IV Push once  dextrose 50% Injectable 25 Gram(s) IV Push once  dextrose 50% Injectable 25 Gram(s) IV Push once  insulin lispro (HumaLOG) corrective regimen sliding scale   SubCutaneous every 6 hours    MEDICATIONS  (PRN):  dextrose 40% Gel 15 Gram(s) Oral once PRN Blood Glucose LESS THAN 70 milliGRAM(s)/deciLiter  glucagon  Injectable 1 milliGRAM(s) IntraMuscular once PRN Glucose <70 milliGRAM(s)/deciLiter      CAPILLARY BLOOD GLUCOSE      POCT Blood Glucose.: 88 mg/dL (10 Mar 2020 06:44)  POCT Blood Glucose.: 94 mg/dL (09 Mar 2020 23:39)  POCT Blood Glucose.: 181 mg/dL (09 Mar 2020 21:41)  POCT Blood Glucose.: 68 mg/dL (09 Mar 2020 19:56)  POCT Blood Glucose.: 201 mg/dL (09 Mar 2020 14:25)    I&O's Summary      PHYSICAL EXAM:  Vital Signs Last 24 Hrs  T(C): 36.8 (09 Mar 2020 23:38), Max: 37.4 (09 Mar 2020 19:27)  T(F): 98.2 (09 Mar 2020 23:38), Max: 99.4 (09 Mar 2020 19:27)  HR: 81 (09 Mar 2020 23:38) (81 - 104)  BP: 135/91 (09 Mar 2020 23:38) (135/91 - 176/109)  BP(mean): --  RR: 20 (09 Mar 2020 23:38) (16 - 21)  SpO2: 100% (09 Mar 2020 23:38) (99% - 100%)    CONSTITUTIONAL: NAD, well-developed, well-groomed  EYES: PERRLA; conjunctiva and sclera clear  ENMT: Moist oral mucosa, no pharyngeal injection or exudates; normal dentition  NECK: Supple, no palpable masses; no thyromegaly  RESPIRATORY: Normal respiratory effort; lungs are clear to auscultation bilaterally  CARDIOVASCULAR: Regular rate and rhythm, normal S1 and S2, no murmur/rub/gallop; No lower extremity edema; Peripheral pulses are 2+ bilaterally  ABDOMEN: Nontender to palpation, normoactive bowel sounds, no rebound/guarding  MUSCULOSKELETAL:  no clubbing or cyanosis of digits; no joint swelling or tenderness to palpation  PSYCH: A+O to person, place, and time; affect appropriate  NEUROLOGY: CN 2-12 are intact and symmetric; no gross sensory deficits   SKIN: No rashes; no palpable lesions    LABS:                        13.7   8.60  )-----------( 244      ( 09 Mar 2020 14:45 )             41.2     03-09    143  |  105  |  26<H>  ----------------------------<  208<H>  3.4<L>   |  21<L>  |  0.71    Ca    10.4      09 Mar 2020 14:45  Mg     2.2     -09    TPro  9.3<H>  /  Alb  4.7  /  TBili  1.1  /  DBili  x   /  AST  18  /  ALT  11  /  AlkPhos  43  03-09    PT/INR - ( 09 Mar 2020 16:39 )   PT: 13.6 SEC;   INR: 1.19          PTT - ( 09 Mar 2020 16:39 )  PTT:23.2 SEC      Urinalysis Basic - ( 09 Mar 2020 19:25 )    Color: YELLOW / Appearance: CLEAR / S.018 / pH: 6.0  Gluc: 500 / Ketone: LARGE  / Bili: NEGATIVE / Urobili: NORMAL   Blood: NEGATIVE / Protein: 50 / Nitrite: NEGATIVE   Leuk Esterase: NEGATIVE / RBC: 0-2 / WBC 0-2   Sq Epi: OCC / Non Sq Epi: x / Bacteria: NEGATIVE          RADIOLOGY & ADDITIONAL TESTS:  Results Reviewed:   Imaging Personally Reviewed:  Electrocardiogram Personally Reviewed:    COORDINATION OF CARE:  Care Discussed with Consultants/Other Providers [Y/N]:  Prior or Outpatient Records Reviewed [Y/N]: [facility] Division of Hospital Medicine  Vic Damon MD PGY-1  Pager: [phone]      Patient is a 49y old  Female who presents with a chief complaint of AMS (09 Mar 2020 19:11)      SUBJECTIVE / OVERNIGHT EVENTS: Patient with spontaneous improvement in symptoms. Patient now AAOx4, cooperative with exam. Patient denies f/c/n/v/sob/cp. Patient states that she was in her normal state of health. No recollection of inciting event before becoming unresponsive.     MEDICATIONS  (STANDING):  dextrose 5%. 1000 milliLiter(s) (50 mL/Hr) IV Continuous <Continuous>  dextrose 50% Injectable 12.5 Gram(s) IV Push once  dextrose 50% Injectable 25 Gram(s) IV Push once  dextrose 50% Injectable 25 Gram(s) IV Push once  insulin lispro (HumaLOG) corrective regimen sliding scale   SubCutaneous every 6 hours    MEDICATIONS  (PRN):  dextrose 40% Gel 15 Gram(s) Oral once PRN Blood Glucose LESS THAN 70 milliGRAM(s)/deciLiter  glucagon  Injectable 1 milliGRAM(s) IntraMuscular once PRN Glucose <70 milliGRAM(s)/deciLiter      CAPILLARY BLOOD GLUCOSE      POCT Blood Glucose.: 88 mg/dL (10 Mar 2020 06:44)  POCT Blood Glucose.: 94 mg/dL (09 Mar 2020 23:39)  POCT Blood Glucose.: 181 mg/dL (09 Mar 2020 21:41)  POCT Blood Glucose.: 68 mg/dL (09 Mar 2020 19:56)  POCT Blood Glucose.: 201 mg/dL (09 Mar 2020 14:25)    I&O's Summary      PHYSICAL EXAM:  Vital Signs Last 24 Hrs  T(C): 36.8 (09 Mar 2020 23:38), Max: 37.4 (09 Mar 2020 19:27)  T(F): 98.2 (09 Mar 2020 23:38), Max: 99.4 (09 Mar 2020 19:27)  HR: 81 (09 Mar 2020 23:38) (81 - 104)  BP: 135/91 (09 Mar 2020 23:38) (135/91 - 176/109)  BP(mean): --  RR: 20 (09 Mar 2020 23:38) (16 - 21)  SpO2: 100% (09 Mar 2020 23:38) (99% - 100%)    CONSTITUTIONAL: NAD, well-developed, well-groomed  EYES: PERRLA; conjunctiva and sclera clear  ENMT: Moist oral mucosa, no pharyngeal injection or exudates; normal dentition  NECK: Supple, no palpable masses; no thyromegaly  RESPIRATORY: Normal respiratory effort; lungs are clear to auscultation bilaterally  CARDIOVASCULAR: Regular rate and rhythm, normal S1 and S2, no murmur/rub/gallop; No lower extremity edema; Peripheral pulses are 2+ bilaterally  ABDOMEN: Nontender to palpation, normoactive bowel sounds, no rebound/guarding  MUSCULOSKELETAL:  no clubbing or cyanosis of digits; no joint swelling or tenderness to palpation  PSYCH: A+O to person, place, and time; affect appropriate  NEUROLOGY: CN 2-12 are intact and symmetric; no gross sensory deficits   SKIN: No rashes; no palpable lesions    LABS:                        13.7   8.60  )-----------( 244      ( 09 Mar 2020 14:45 )             41.2     03-    143  |  105  |  26<H>  ----------------------------<  208<H>  3.4<L>   |  21<L>  |  0.71    Ca    10.4      09 Mar 2020 14:45  Mg     2.2     -09    TPro  9.3<H>  /  Alb  4.7  /  TBili  1.1  /  DBili  x   /  AST  18  /  ALT  11  /  AlkPhos  43  03-09    PT/INR - ( 09 Mar 2020 16:39 )   PT: 13.6 SEC;   INR: 1.19          PTT - ( 09 Mar 2020 16:39 )  PTT:23.2 SEC      Urinalysis Basic - ( 09 Mar 2020 19:25 )    Color: YELLOW / Appearance: CLEAR / S.018 / pH: 6.0  Gluc: 500 / Ketone: LARGE  / Bili: NEGATIVE / Urobili: NORMAL   Blood: NEGATIVE / Protein: 50 / Nitrite: NEGATIVE   Leuk Esterase: NEGATIVE / RBC: 0-2 / WBC 0-2   Sq Epi: OCC / Non Sq Epi: x / Bacteria: NEGATIVE          RADIOLOGY & ADDITIONAL TESTS:  Results Reviewed:   Imaging Personally Reviewed:  Electrocardiogram Personally Reviewed:    COORDINATION OF CARE:  Care Discussed with Consultants/Other Providers [Y/N]:  Prior or Outpatient Records Reviewed [Y/N]:

## 2020-03-10 NOTE — DISCHARGE NOTE PROVIDER - NSDCCPTREATMENT_GEN_ALL_CORE_FT
PRINCIPAL PROCEDURE  Procedure: CT head wo con  Findings and Treatment: INTERPRETATION:  Clinical indication: Altered mental status  Technique:  Multiple axial sections were acquired from the base of the skull to the vertex without contrast enhancement. Coronal and sagittal reconstructed images were performed as well.  Findings:  The lateral ventricles have a normal configuration.  There is no evidence of acute hemorrhage, mass or mass-effect in the posterior fossa or in the supratentorial region.  Evaluation of the osseous structures with the appropriate window appears unremarkable.  Impression:  Unremarkable noncontrast head CT.

## 2020-03-10 NOTE — CHART NOTE - NSCHARTNOTEFT_GEN_A_CORE
Spoke at length with patient with family out of room.  She says she thinks the cause of her altered mental status yesterday was the stress in her life becoming too much to handle.  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  Her family suspects her  of infidelity, and patient is thinking it might be approaching time to end the relationship, but she is still living with him and she feels safe at home and denies being subjected to abuse.  She also denies that her children are subjected to abuse, and patient feels safe at work.      She denies recreational drugs other than an occasional glass of wine.  She asked the physician, "Why are you asking me about drugs?  Did something show up on my tests?"      She denies history of anxiety, depression, or other mental illness.  She thinks her in-laws are trying to get her to say that she is crazy, but cannot elaborate on why.  She says sometimes she is up for a night or two, but not longer.  Denies excessive spending sprees or making other impulsive, regrettable decisions.  She denies suicidal ideation, history of suicide attempts, and homicidal ideation.  Patient denies visual and auditory hallucinations.      Patient offered outpatient therapy referral, which patient would like, and Social Work informed.    -KRZYSZTOF Amaya MD  EM/IM PGY-5  #[id number] Spoke at length with patient with family out of room.  She says she thinks the cause of her altered mental status yesterday was the stress in her life becoming too much to handle.  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  Her family suspects her  of infidelity, and patient is thinking it might be approaching time to end the relationship, but she is still living with him and she feels safe at home and denies being subjected to abuse.  She also denies that her children are subjected to abuse, and patient feels safe at work.      She denies recreational drugs other than an occasional glass of wine.  She asked the physician, "Why are you asking me about drugs?  Did something show up on my tests?"      She denies history of anxiety, depression, or other mental illness.  She thinks her in-laws are trying to get her to say that she is crazy, but cannot elaborate on why.  Patient states, "I'm not crazy."  She says sometimes she is up for a night or two, but not longer.  Denies excessive spending sprees or making other impulsive, regrettable decisions.  She denies suicidal ideation, history of suicide attempts, and homicidal ideation.  Patient denies visual and auditory hallucinations.      Patient offered outpatient therapy referral, which patient would like, and Social Work informed.    RICH Amaya MD  EM/IM PGY-5  #[id number]

## 2020-03-10 NOTE — DISCHARGE NOTE NURSING/CASE MANAGEMENT/SOCIAL WORK - PATIENT PORTAL LINK FT
You can access the FollowMyHealth Patient Portal offered by [facility] by registering at the following website: [email or web address]. By joining [employer]’s FollowMyHealth portal, you will also be able to view your health information using other applications (apps) compatible with our system.

## 2020-03-10 NOTE — PROGRESS NOTE ADULT - PROBLEM SELECTOR PLAN 1
Patient with sudden change in mental status concerning for infectious vs metabolic vs psychogenic etiology.   -f/u MRA head, MRA neck   -f/u LP   -f/u utox, folate, B12  -neuro consulted, recs appreciated  -routine EEG  -psych consult in AM   -q4 neurochecks  -trial of IV ativan for possible catatonia given waxy flexibility in setting of home stressor Patient with sudden change in mental status, now resolved, with concern for psychiatric vs organic etiology  - per neuro, no need for MRI/MRA, EEG, LP given resolution of symptoms   - will discuss psychiatric eval w patient

## 2020-03-10 NOTE — PROGRESS NOTE ADULT - ATTENDING COMMENTS
49W no significant PMHx p/w sudden unresponsiveness. CTH without acute pathology. Labs without significant abnormalities. Now AOx3 without neurologic deficits. Likely catatonia vs conversion disorder. Patient with stressful relationship with ; no physical or emotional abuse. Discuss with Dr. Perez (neuro) and no need for MRI, MRA, or LP.    Patient stable for discharge home. If amenable, may f/u with psychiatry as outpatient.    I spent 35 minutes counseling this patient and coordinating their discharge.

## 2020-03-11 LAB
CULTURE RESULTS: NO GROWTH — SIGNIFICANT CHANGE UP
SPECIMEN SOURCE: SIGNIFICANT CHANGE UP

## 2020-03-14 LAB
CULTURE RESULTS: SIGNIFICANT CHANGE UP
CULTURE RESULTS: SIGNIFICANT CHANGE UP
SPECIMEN SOURCE: SIGNIFICANT CHANGE UP
SPECIMEN SOURCE: SIGNIFICANT CHANGE UP

## 2020-03-17 LAB — HEAVY MET PNL SERPL: SIGNIFICANT CHANGE UP

## 2020-06-04 NOTE — PATIENT PROFILE ADULT - NSPROPASSIVESMOKEEXPOSURE_GEN_A_NUR
DATE OF DISCHARGE:  06/03/2020    DISCHARGE DIAGNOSIS:  Status post anterior posterior fusion L5-S1 with   decompression.    OPERATION/PROCEDURE:  L5-S1 posterior decompression done in staged setting.    ATTENDING PHYSICIAN:  Dl Merrill MD    BRIEF HISTORY AND REASON FOR ADMISSION:  The patient was admitted for surgery   in the form of an anterior lumbar interbody fusion at L5-S1.  This was   followed by a staged procedure for posterior fusion at L5-S1 decompression.    These were performed without apparent complication.    HOSPITAL COURSE:  Patient's hospital course was uncomplicated.  He was   admitted postoperatively for IV antibiotics and pain control.  He has   mobilized well with physical therapy and at this time is felt stable and ready   for discharge home.  He is tolerating Percocet for pain.  He has been   ambulating well.  He has been given a prescription for Percocet 1-2 q. 4 hours   p.r.n. pain.  His risk factor for addiction is 0.    DISCHARGE PLAN:  He is to follow up in the office in about 10 days.  He is to   continue ambulation protocol.  He knows to call the office with any problems   or questions including any symptoms such as neurologic changes, fevers,   chills, or wound drainage.  He will continue all of his current home   medications, except his meloxicam, which will be stopped.       ____________________________________     Dl Merrill MD    [facility] / [facility]    DD:  06/03/2020 12:43:43  DT:  06/04/2020 08:54:21    D#:  [id number]  Job#:  [id number]    
No

## 2020-12-24 ENCOUNTER — INPATIENT (INPATIENT)
Facility: HOSPITAL | Age: 50
LOS: 0 days | Discharge: LEFT BEFORE DISCHARGE | End: 2020-12-25
Attending: HOSPITALIST | Admitting: HOSPITALIST
Payer: COMMERCIAL

## 2020-12-24 VITALS
HEART RATE: 70 BPM | OXYGEN SATURATION: 100 % | RESPIRATION RATE: 18 BRPM | DIASTOLIC BLOOD PRESSURE: 100 MMHG | TEMPERATURE: 98 F | SYSTOLIC BLOOD PRESSURE: 155 MMHG | HEIGHT: 63 IN

## 2020-12-24 DIAGNOSIS — F32.3 MAJOR DEPRESSIVE DISORDER, SINGLE EPISODE, SEVERE WITH PSYCHOTIC FEATURES: ICD-10-CM

## 2020-12-24 DIAGNOSIS — F06.1 CATATONIC DISORDER DUE TO KNOWN PHYSIOLOGICAL CONDITION: ICD-10-CM

## 2020-12-24 DIAGNOSIS — T65.92XA TOXIC EFFECT OF UNSPECIFIED SUBSTANCE, INTENTIONAL SELF-HARM, INITIAL ENCOUNTER: ICD-10-CM

## 2020-12-24 DIAGNOSIS — Z29.9 ENCOUNTER FOR PROPHYLACTIC MEASURES, UNSPECIFIED: ICD-10-CM

## 2020-12-24 DIAGNOSIS — Z98.891 HISTORY OF UTERINE SCAR FROM PREVIOUS SURGERY: Chronic | ICD-10-CM

## 2020-12-24 DIAGNOSIS — R41.82 ALTERED MENTAL STATUS, UNSPECIFIED: ICD-10-CM

## 2020-12-24 LAB
ALBUMIN SERPL ELPH-MCNC: 3.9 G/DL — SIGNIFICANT CHANGE UP (ref 3.3–5)
ALP SERPL-CCNC: 38 U/L — LOW (ref 40–120)
ALT FLD-CCNC: 14 U/L — SIGNIFICANT CHANGE UP (ref 4–33)
ANION GAP SERPL CALC-SCNC: 7 MMOL/L — SIGNIFICANT CHANGE UP (ref 7–14)
APPEARANCE UR: CLEAR — SIGNIFICANT CHANGE UP
AST SERPL-CCNC: 21 U/L — SIGNIFICANT CHANGE UP (ref 4–32)
BACTERIA # UR AUTO: NEGATIVE — SIGNIFICANT CHANGE UP
BASOPHILS # BLD AUTO: 0.01 K/UL — SIGNIFICANT CHANGE UP (ref 0–0.2)
BASOPHILS NFR BLD AUTO: 0.1 % — SIGNIFICANT CHANGE UP (ref 0–2)
BILIRUB SERPL-MCNC: 0.4 MG/DL — SIGNIFICANT CHANGE UP (ref 0.2–1.2)
BILIRUB UR-MCNC: NEGATIVE — SIGNIFICANT CHANGE UP
BUN SERPL-MCNC: 8 MG/DL — SIGNIFICANT CHANGE UP (ref 7–23)
CALCIUM SERPL-MCNC: 9.4 MG/DL — SIGNIFICANT CHANGE UP (ref 8.4–10.5)
CHLORIDE SERPL-SCNC: 107 MMOL/L — SIGNIFICANT CHANGE UP (ref 98–107)
CO2 SERPL-SCNC: 31 MMOL/L — SIGNIFICANT CHANGE UP (ref 22–31)
COLOR SPEC: SIGNIFICANT CHANGE UP
CREAT SERPL-MCNC: 0.65 MG/DL — SIGNIFICANT CHANGE UP (ref 0.5–1.3)
DIFF PNL FLD: NEGATIVE — SIGNIFICANT CHANGE UP
EOSINOPHIL # BLD AUTO: 0.02 K/UL — SIGNIFICANT CHANGE UP (ref 0–0.5)
EOSINOPHIL NFR BLD AUTO: 0.2 % — SIGNIFICANT CHANGE UP (ref 0–6)
EPI CELLS # UR: 2 /HPF — SIGNIFICANT CHANGE UP (ref 0–5)
GLUCOSE SERPL-MCNC: 100 MG/DL — HIGH (ref 70–99)
GLUCOSE UR QL: NEGATIVE — SIGNIFICANT CHANGE UP
HCT VFR BLD CALC: 34.7 % — SIGNIFICANT CHANGE UP (ref 34.5–45)
HGB BLD-MCNC: 11.3 G/DL — LOW (ref 11.5–15.5)
HYALINE CASTS # UR AUTO: 1 /LPF — SIGNIFICANT CHANGE UP (ref 0–7)
IANC: 7.2 K/UL — SIGNIFICANT CHANGE UP (ref 1.5–8.5)
IMM GRANULOCYTES NFR BLD AUTO: 0.4 % — SIGNIFICANT CHANGE UP (ref 0–1.5)
KETONES UR-MCNC: NEGATIVE — SIGNIFICANT CHANGE UP
LEUKOCYTE ESTERASE UR-ACNC: ABNORMAL
LYMPHOCYTES # BLD AUTO: 0.82 K/UL — LOW (ref 1–3.3)
LYMPHOCYTES # BLD AUTO: 9.7 % — LOW (ref 13–44)
MCHC RBC-ENTMCNC: 27.4 PG — SIGNIFICANT CHANGE UP (ref 27–34)
MCHC RBC-ENTMCNC: 32.6 GM/DL — SIGNIFICANT CHANGE UP (ref 32–36)
MCV RBC AUTO: 84.2 FL — SIGNIFICANT CHANGE UP (ref 80–100)
MONOCYTES # BLD AUTO: 0.36 K/UL — SIGNIFICANT CHANGE UP (ref 0–0.9)
MONOCYTES NFR BLD AUTO: 4.3 % — SIGNIFICANT CHANGE UP (ref 2–14)
NEUTROPHILS # BLD AUTO: 7.2 K/UL — SIGNIFICANT CHANGE UP (ref 1.8–7.4)
NEUTROPHILS NFR BLD AUTO: 85.3 % — HIGH (ref 43–77)
NITRITE UR-MCNC: NEGATIVE — SIGNIFICANT CHANGE UP
NRBC # BLD: 0 /100 WBCS — SIGNIFICANT CHANGE UP
NRBC # FLD: 0 K/UL — SIGNIFICANT CHANGE UP
PH UR: 7 — SIGNIFICANT CHANGE UP (ref 5–8)
PLATELET # BLD AUTO: 193 K/UL — SIGNIFICANT CHANGE UP (ref 150–400)
POTASSIUM SERPL-MCNC: 3.8 MMOL/L — SIGNIFICANT CHANGE UP (ref 3.5–5.3)
POTASSIUM SERPL-SCNC: 3.8 MMOL/L — SIGNIFICANT CHANGE UP (ref 3.5–5.3)
PROT SERPL-MCNC: 7.3 G/DL — SIGNIFICANT CHANGE UP (ref 6–8.3)
PROT UR-MCNC: ABNORMAL
RBC # BLD: 4.12 M/UL — SIGNIFICANT CHANGE UP (ref 3.8–5.2)
RBC # FLD: 13.3 % — SIGNIFICANT CHANGE UP (ref 10.3–14.5)
RBC CASTS # UR COMP ASSIST: 1 /HPF — SIGNIFICANT CHANGE UP (ref 0–4)
SARS-COV-2 RNA SPEC QL NAA+PROBE: SIGNIFICANT CHANGE UP
SODIUM SERPL-SCNC: 145 MMOL/L — SIGNIFICANT CHANGE UP (ref 135–145)
SP GR SPEC: 1.02 — SIGNIFICANT CHANGE UP (ref 1.01–1.02)
TOXICOLOGY SCREEN, DRUGS OF ABUSE, SERUM RESULT: SIGNIFICANT CHANGE UP
TSH SERPL-MCNC: 1.2 UIU/ML — SIGNIFICANT CHANGE UP (ref 0.27–4.2)
TSH SERPL-MCNC: 1.24 UIU/ML — SIGNIFICANT CHANGE UP (ref 0.27–4.2)
UROBILINOGEN FLD QL: SIGNIFICANT CHANGE UP
WBC # BLD: 8.44 K/UL — SIGNIFICANT CHANGE UP (ref 3.8–10.5)
WBC # FLD AUTO: 8.44 K/UL — SIGNIFICANT CHANGE UP (ref 3.8–10.5)
WBC UR QL: 3 /HPF — SIGNIFICANT CHANGE UP (ref 0–5)

## 2020-12-24 PROCEDURE — 99223 1ST HOSP IP/OBS HIGH 75: CPT

## 2020-12-24 PROCEDURE — 90792 PSYCH DIAG EVAL W/MED SRVCS: CPT

## 2020-12-24 PROCEDURE — 99285 EMERGENCY DEPT VISIT HI MDM: CPT

## 2020-12-24 PROCEDURE — 70450 CT HEAD/BRAIN W/O DYE: CPT | Mod: 26

## 2020-12-24 RX ORDER — ENOXAPARIN SODIUM 100 MG/ML
40 INJECTION SUBCUTANEOUS DAILY
Refills: 0 | Status: DISCONTINUED | OUTPATIENT
Start: 2020-12-24 | End: 2020-12-25

## 2020-12-24 RX ORDER — ACETAMINOPHEN 500 MG
650 TABLET ORAL EVERY 6 HOURS
Refills: 0 | Status: DISCONTINUED | OUTPATIENT
Start: 2020-12-24 | End: 2020-12-25

## 2020-12-24 RX ORDER — ONDANSETRON 8 MG/1
4 TABLET, FILM COATED ORAL EVERY 6 HOURS
Refills: 0 | Status: DISCONTINUED | OUTPATIENT
Start: 2020-12-24 | End: 2020-12-25

## 2020-12-24 NOTE — ED BEHAVIORAL HEALTH ASSESSMENT NOTE - DESCRIPTION
none During course of ED visit patient was calm and cooperative. Patient was not aggressive or violent and did not require PRN medications.    Vital Signs Last 24 Hrs  T(C): 36.4 (24 Dec 2020 06:32), Max: 36.4 (24 Dec 2020 06:32)  T(F): 97.5 (24 Dec 2020 06:32), Max: 97.5 (24 Dec 2020 06:32)  HR: 70 (24 Dec 2020 06:32) (70 - 70)  BP: 155/100 (24 Dec 2020 06:32) (155/100 - 155/100)  BP(mean): --  RR: 18 (24 Dec 2020 06:32) (18 - 18)  SpO2: 100% (24 Dec 2020 06:32) (100% - 100%) see hpi

## 2020-12-24 NOTE — H&P ADULT - PROBLEM SELECTOR PLAN 2
-Psych consulted and recommending Based on her age and repeated symptoms with previous rapid resolution of symptoms it would benefit patient to have further medical work to r/o medical etiology.  -Will continue 1:1 CO

## 2020-12-24 NOTE — ED BEHAVIORAL HEALTH ASSESSMENT NOTE - SUMMARY
Patient is a 50 year old  [place] female with 4 adult children. Patient currently awaiting disability paperwork from job as an . No formal psychiatric history. No hx of suicide attempts. No history of inpatient admissions. Patient was admitted 3/9/20 x 1 day after an incident of unresponsiveness/AMS which was determined to have no medical etiology and patient was referred to [facility] crisis clinic for followup. No hx of aggression/violence, legal issues or substance abuse issues. No PMH. BIBA referred by family for hydrogen peroxide ingestion. Patient is a 50 year old  [place] female with 4 adult children. Patient currently awaiting disability paperwork from job as an . No formal psychiatric history. No hx of suicide attempts. No history of inpatient admissions. Patient was admitted 3/9/20 x 1 day after an incident of unresponsiveness/AMS which was determined to have no medical etiology and patient was referred to [facility] crisis clinic for followup. No hx of aggression/violence, legal issues or substance abuse issues. No PMH. BIBA referred by family for hydrogen peroxide ingestion.    Patient presents to the ED in the context of hydrogen peroxide ingestion. Patient's ingestion does not appear to have been purposeful based on patient and family report however shows a level of disorganization by the patient due to her current psychiatric state as she appears extremely depressed with psychotic features and r/o symptoms of catatonia. She is not eating or sleeping, has lost weight and is not functioning requiring a leave from her job. Patient was offered and refuses inpatient psychiatric admission. Patient is unable to function, disorganized, depressed and psychotic. She presents at imminent risk to self and requires inpatient admission for safety and stabilization. Patient is a 50 year old  Luxembourger female with 4 adult children. Patient currently awaiting disability paperwork from job as an . No formal psychiatric history. No hx of suicide attempts. No history of inpatient admissions. Patient was admitted 3/9/20 x 1 day after an incident of unresponsiveness/AMS which was determined to have no medical etiology and patient was referred to [facility] crisis clinic for followup. No hx of aggression/violence, legal issues or substance abuse issues. No PMH. BIBA referred by family for hydrogen peroxide ingestion.    Patient presents to the ED in the context of hydrogen peroxide ingestion. Patient's ingestion does not appear to have been purposeful based on patient and family report however shows a level of disorganization by the patient due to her current mental status. he is not eating or sleeping, has lost weight and is not functioning requiring a leave from her job.     Patient presented in March 2020 for a similar presentation and was admitted to medicine x 1 day. Her symptoms suddenly resolved and she was discharged. She appeared to have functioned until end of November 2020 and now again is presenting with AMS. No medical workup was done during last inpatient ie: LP, MRI due to her rapid resolution of symptoms. Based on her age and repeated symptoms with previous rapid resolution of symptoms it would benefit patient to have further medical work to r/o medical etiology. Patient is a 50 year old  female with 4 adult children. Patient currently awaiting disability paperwork from job as an . No formal psychiatric history. No hx of suicide attempts. No history of inpatient admissions. Patient was admitted 3/9/20 x 1 day after an incident of unresponsiveness/AMS which was determined to have no medical etiology and patient was referred to [facility] crisis clinic for followup. No hx of aggression/violence, legal issues or substance abuse issues. No PMH. BIBA referred by family for hydrogen peroxide ingestion.    Patient presents to the ED in the context of hydrogen peroxide ingestion. Patient's ingestion does not appear to have been purposeful based on patient and family report however it shows a level of disorganization by the patient due to her current mental status. She is not eating or sleeping, has lost weight and is not functioning requiring a leave from her job. She is minimally responsive with answers and has decreased productivity of speech.     Patient presented in March 2020 for a similar presentation and was admitted to medicine x 1 day. Her symptoms suddenly resolved and she was discharged. She appeared to have functioned until end of November 2020 and now again is presenting with AMS. No medical workup was done during last inpatient admission ie: LP, MRI due to her rapid resolution of symptoms. Based on her age and repeated symptoms with previous rapid resolution of symptoms it would benefit patient to have further medical work up to r/o medical etiology. Recommend admit to medicine with CL follow up.

## 2020-12-24 NOTE — H&P ADULT - ATTENDING COMMENTS
51yo Female with no significant PMH and no reported past psychiatric history presents after ingesting about 6 to 8 oz of over the counter pharmacy grade hydrogen peroxide this morning. Patient reports drinking the hydrogen peroxide because she was overwhelmed. After ingestion had abdominal pain and nausea; which has resolved. States she has felt different for the past several months but will not elaborate. Patient states her family says she is different. Patient is worried about her kids. Denies being suicidal, states the well being of her family keeps her going. VSS, slightly hypertensive on admission but improved now normotensive (155/100).   - seen by toxicology--- recommending CT/AP if abdominal pain persistent otherwise patient cleared from a tox perspective for d'c   - patient w/ AMS: Patient is not eating or sleeping, has lost weight and is not functioning requiring a leave from her job. Patient is minimally responsive with answers and has decreased productivity of speech psychiatry recommending medical eval to r/o organic cause: TSH wnl, will send B12, folate, UTOX, Syphilis Screen  - if w/ persistent AMS will get neuro consult  - d'c pending further eval for AMS, and monitoring post ingestion

## 2020-12-24 NOTE — ED ADULT NURSE REASSESSMENT NOTE - NS ED NURSE REASSESS COMMENT FT1
Received pt in  from main ER, pt calm & cooperative denies si/hii/avh presently safety & comfort measures maintained eval on going.

## 2020-12-24 NOTE — ED BEHAVIORAL HEALTH ASSESSMENT NOTE - PRIMARY DX
MDD (major depressive disorder), single episode, severe with psychotic features Altered mental status

## 2020-12-24 NOTE — ED PROVIDER NOTE - PROGRESS NOTE DETAILS
Sedrick, PGY3- psych requesting admission to medicine for catatonia. Dr. Boo accepting. Vitals remain wnl. Serologies CTH unremarkable.

## 2020-12-24 NOTE — ED BEHAVIORAL HEALTH ASSESSMENT NOTE - DIFFERENTIAL
Schizoaffective Catatonia  Schizoaffective Psychosis due to [facility]  MDD with psychosis  Catatonia  Schizoaffective

## 2020-12-24 NOTE — H&P ADULT - ASSESSMENT
49yo Female with no significant PMH and no reported past psychiatric history presents after ingesting about 6 to 8 oz of over the counter pharmacy grade hydrogen peroxide this morning.

## 2020-12-24 NOTE — ED ADULT NURSE REASSESSMENT NOTE - NS ED NURSE REASSESS COMMENT FT1
1800 Pt calm & cooperative denies si/hi/avh presently pt  made aware of admission to medical unit eval on going.

## 2020-12-24 NOTE — ED PROVIDER NOTE - EKG #1 DATE/TIME
Pt informed of x-ray results and given scheduling's number to call and schedule appt with Dr. Esquivel.  Christiane   24-Dec-2020 08:14

## 2020-12-24 NOTE — ED BEHAVIORAL HEALTH ASSESSMENT NOTE - PSYCHIATRIC ISSUES AND PLAN (INCLUDE STANDING AND PRN MEDICATION)
Start Prozac 10mg daily, Ativan 0.5mg TID, Risperdal 0.5mg QHS, Ativan 1mg PO/IM PRN, Haldol 2.5mg IM PRN Start Prozac 10mg daily, Risperdal 0.5mg QHS, Ativan 1mg PO/IM PRN, Haldol 2.5mg IM PRN Ativan 1mg PO/IM PRN

## 2020-12-24 NOTE — ED PROVIDER NOTE - CLINICAL SUMMARY MEDICAL DECISION MAKING FREE TEXT BOX
pt with intentional ingestion of hydrogen peroxide  Current reporting abd pain but exam is benign.  Discussed case with tox fellow who states pt is ok for supportive care.  If develops severe pain could need CT to eval for portal air embolism but currently not the case.  Will clear medically and have BH evaluate the patient.
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
## 2020-12-24 NOTE — ED PROVIDER NOTE - OBJECTIVE STATEMENT
51 yo with unknown PMH but denies depression presenting after an ingestion of about 6 to 8 oz of over the counter pharmacy grade hydrogen peroxide.  States consumed it about 90 minutes PTA.  States family heard her throwing up and then noticed she had drank it and called EMS.  Per pt she "has a lot of things going on".  Will not say trying to harm herself but will not expound on the circumstances.  Currently no NV but does report some abdominal pains.

## 2020-12-24 NOTE — ED BEHAVIORAL HEALTH ASSESSMENT NOTE - HPI (INCLUDE ILLNESS QUALITY, SEVERITY, DURATION, TIMING, CONTEXT, MODIFYING FACTORS, ASSOCIATED SIGNS AND SYMPTOMS)
Patient is a 50 year old  [place] female with 4 adult children. Patient currently awaiting disability paperwork from job as an . No formal psychiatric history. No hx of suicide attempts. No history of inpatient admissions. Patient was admitted 3/9/20 x 1 day after an incident of unresponsiveness/AMS which was determined to have no medical etiology and patient was referred to [facility] crisis clinic for followup. No hx of aggression/violence, legal issues or substance abuse issues. No PMH. BIBA referred by family for hydrogen peroxide ingestion.     Patient reports she came to the ED because she drank hydrogen peroxide. She denies it was a suicide attempt. She stated she reached over and thought it was water or soda and drank it. She stated it was the middle of the night and she could not see. She reports then her stomach hurt and she started throwing up and her family called 911.     Patient reports she has been staying with her parents and sister because she has been having marital issues at home. When asked to elaborate she stated "things aren't what they should be." She was asked to elaborate further and she shrugged. She stated she is on disability from her job because "I have been having some issues." When asked to elaborate she stated they are marital issues. Patient reports she has been depressed "on and off," and again could not elaborate further. She stated she is eating and sleeping. She was challenging to interview due to her soft voice and minimal responses. She refused to elaborate further.    Patient denies any hallucinations, does not report any delusional thought content, denies thought insertion/withdrawal, denies referential thought processes & is not paranoid on interview. Pt is linear,logical, organized and well related. Patient does not report nor exhibit any signs of rancho, including irritable or elevated mood, grandiosity, pressured speech, risk-taking behaviors, increase in productivity or agitation. Patient denies anhedonia, changes in energy/concentration/appetite, sleep disturbances, preoccupation with death or feelings of guilt. Patient adamantly denies SI, intent or plan; denies any HI, violent thoughts.     See  note for collateral information. Patient is a 50 year old  Tajik female with 4 adult children. Patient currently awaiting disability paperwork from job as an . No formal psychiatric history. No hx of suicide attempts. No history of inpatient admissions. Patient was admitted 3/9/20 x 1 day after an incident of unresponsiveness/AMS which was determined to have no medical etiology and patient was referred to [facility] crisis clinic for followup. No hx of aggression/violence, legal issues or substance abuse issues. No PMH. BIBA referred by family for hydrogen peroxide ingestion.     Patient reports she came to the ED because she drank hydrogen peroxide. She denies it was a suicide attempt. She stated she reached over and thought it was water or soda and drank it. She stated it was the middle of the night and she could not see. She reports then her stomach hurt and she started throwing up and her family called 911.     Patient reports she has been staying with her parents and sister because she has been having marital issues at home. When asked to elaborate she stated "things aren't what they should be." She was asked to elaborate further and she shrugged. She stated she is on disability from her job because "I have been having some issues." When asked to elaborate she stated they are marital issues. Patient reports she has been depressed "on and off," and again could not elaborate further. She stated she is eating and sleeping but when confronted with sister's information she stares blankly at evaluator. She was challenging to interview due to her soft voice and minimal responses. She refused to elaborate further and provided minimal information. She answers most questions by shaking or nodding her head. She appears to have catatonic features.     Patient denies any hallucinations, does not report any delusional thought content, denies thought insertion/withdrawal & denies referential thought processes.  Patient does not report nor exhibit any signs of rancho, including irritable or elevated mood, grandiosity, pressured speech, risk-taking behaviors, increase in productivity or agitation.  Patient denies SI, intent or plan; denies any HI, violent thoughts.     See  note for collateral information. Patient is a 50 year old   female with 4 adult children. Patient currently awaiting disability paperwork from job as an . No formal psychiatric history. No hx of suicide attempts. No history of inpatient admissions. Patient was admitted 3/9/20 x 1 day after an incident of unresponsiveness/AMS which was determined to have no medical etiology and patient was referred to [facility] crisis clinic for followup. No hx of aggression/violence, legal issues or substance abuse issues. No PMH. BIBA referred by family for hydrogen peroxide ingestion.     Patient reports she came to the ED because she drank hydrogen peroxide. She denies it was a suicide attempt. She stated she reached over and thought it was water or soda and drank it. She stated it was the middle of the night and she could not see. She reports then her stomach hurt and she started throwing up and her family called 911.     Patient reports she has been staying with her parents and sister because she has been having marital issues at home. When asked to elaborate she stated "things aren't what they should be." She was asked to elaborate further and she shrugged. She stated she is on disability from her job because "I have been having some issues." When asked to elaborate she stated they are marital issues. Patient reports she has been depressed "on and off," and again could not elaborate further. She stated she is eating and sleeping but when confronted with sister's information she stares blankly at evaluator. She was challenging to interview due to her soft voice and minimal responses. She refused to elaborate further and provided minimal information. She answers most questions by shaking or nodding her head.     Patient denies any hallucinations, does not report any delusional thought content, denies thought insertion/withdrawal & denies referential thought processes.  Patient does not report nor exhibit any signs of rancho, including irritable or elevated mood, grandiosity, pressured speech, risk-taking behaviors, increase in productivity or agitation.  Patient denies SI, intent or plan; denies any HI, violent thoughts.     See  note for collateral information.

## 2020-12-24 NOTE — CONSULT NOTE ADULT - ASSESSMENT
Over the counter, or 3%, hydrogen peroxide is generally well tolerated. It typically is a gastric mucosa irritant and can cause gastritis. It typically is not associated with caustic injuries. Most importantly, if persistent pain is a feature of the patient's clinical course, portal vein gas embolism has been reported and can be diagnosed with CT scan. Embolism to the portal system is much more common with food grade, or 20%, hydrogen peroxide. If embolism in any organ system is present, hyperbaric oxygen therapy is indicated.
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
## 2020-12-24 NOTE — ED PROVIDER NOTE - PHYSICAL EXAMINATION
Gen: Well appearing in NAD  Head: NC/AT  Neck: trachea midline  Resp:  No distress  Abd: soft NT ND  Ext: no deformities  Neuro:  A&O appears non focal  Skin:  Warm and dry as visualized  Psych:  Flat affect and sad mood

## 2020-12-24 NOTE — ED ADULT TRIAGE NOTE - CHIEF COMPLAINT QUOTE
Pt. arrives via EMS after drinking about 1/4 bottle of hydrogen peroxide. Endorses nausea. Denies vomiting, homicide ideation, auditory/visual hallucinations. Calm and cooperative at present. Awake & alert. Pt. arrives via EMS after drinking about 1/4 bottle of hydrogen peroxide. Endorses nausea. Denies vomiting, homicide ideation, auditory/visual hallucinations. Calm and cooperative at present. Awake & alert. Pt. evaluated by MD Irvin, per MD, pt. to be seen in . Escorted to . Pt. arrives via EMS after drinking about 1/4 bottle of hydrogen peroxide. Endorses nausea. Denies vomiting, homicide ideation, auditory/visual hallucinations. Calm and cooperative at present. Awake & alert. Pt. evaluated by MD Irvin, per MD, pt. to be seen in main ED.
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
## 2020-12-24 NOTE — CONSULT NOTE ADULT - PROBLEM SELECTOR RECOMMENDATION 9
-CT A/P if pain is persistent  -rule out coingestions  -pain control  -supportive care  -consider alternative causes of abdominal pain/TTP if clinically indicated  -if no significant pain or physical exam findings and medical workup negative, can be cleared from an acute toxicologic emergency   -please call [phone] for any significant change in clinical status

## 2020-12-24 NOTE — ED BEHAVIORAL HEALTH ASSESSMENT NOTE - CASE SUMMARY
Patient is a 50 year old  female with 4 adult children. Patient currently awaiting disability paperwork from job as an . No formal psychiatric history. No hx of suicide attempts. No history of inpatient admissions. Patient was admitted 3/9/20 x 1 day after an incident of unresponsiveness/AMS which was determined to have no medical etiology and patient was referred to [facility] crisis clinic for followup. No hx of aggression/violence, legal issues or substance abuse issues. No PMH. BIBA referred by family for hydrogen peroxide ingestion.    Patient presents to the ED in the context of hydrogen peroxide ingestion. Patient's ingestion does not appear to have been purposeful based on patient and family report however it shows a level of disorganization by the patient due to her current mental status. She is not eating or sleeping, has lost weight and is not functioning requiring a leave from her job. She is minimally responsive with answers and has decreased productivity of speech.     Patient presented in March 2020 for a similar presentation and was admitted to medicine x 1 day. Her symptoms suddenly resolved and she was discharged. She appeared to have functioned until end of November 2020 and now again is presenting with AMS. No medical workup was done during last inpatient admission ie: LP, MRI due to her rapid resolution of symptoms. Based on her age and repeated symptoms with previous rapid resolution of symptoms it would benefit patient to have further medical work up to r/o medical etiology. Recommend admit to medicine with CL follow up.

## 2020-12-24 NOTE — H&P ADULT - NEUROLOGICAL DETAILS
alert and oriented x 3/responds to pain/responds to verbal commands/cranial nerves intact/no spontaneous movement

## 2020-12-24 NOTE — H&P ADULT - ADDITIONAL PE
appears calm and cooperative; flat affect; depressed mood, poor eye contact, low volume to speech and speech is minimal

## 2020-12-24 NOTE — ED BEHAVIORAL HEALTH ASSESSMENT NOTE - OTHER PAST PSYCHIATRIC HISTORY (INCLUDE DETAILS REGARDING ONSET, COURSE OF ILLNESS, INPATIENT/OUTPATIENT TREATMENT)
No formal psychiatric history. No hx of suicide attempts. No history of inpatient admissions. Patient was admitted 3/9/20 x 1 day after an incident of unresponsiveness/AMS which was determined to have no medical etiology and patient was referred to [facility] crisis clinic for followup.

## 2020-12-24 NOTE — ED ADULT NURSE REASSESSMENT NOTE - NS ED NURSE REASSESS COMMENT FT1
Received pt at change of shift, PT is resting in stretcher, easily arousable to verbal stimuli, A+Ox3. pt states she has episodes of depression, remains on CO for risk of harm to self, denies SI/HI, PCA at bedside. will continue to monitor.

## 2020-12-24 NOTE — ED ADULT NURSE NOTE - OBJECTIVE STATEMENT
pt presents to RM 3 AO4 ambulatory after suspected suicide attempt. Pt states she drank 1/2 bottle of hydrogen peroxide by accident. Denies any SI/SA, denies wanting to hurt herself. Denies any ETOH or drug use. Denies pt presents to  3 AO4 ambulatory after suspected suicide attempt. Pt states she drank 1/4 bottle of hydrogen peroxide by accident. Denies any SI/SA, denies wanting to hurt herself. Denies any ETOH or drug use. Endorses abd pain and endorses vomiting ~4 times. Denies any sob, chest pain, diff breathing. Denies any significant PMH/PPH appears to be resting comfortably. Belongings secured and brought to security by ED Raoul Stafford. PCA Aruna at bedside, patient under constant observation, safety maintained.

## 2020-12-24 NOTE — H&P ADULT - PROBLEM SELECTOR PLAN 1
-Admit to Medicine  -Toxicology consult: CT A/P if pain is persistent.  rule out coingestions.  pain control.  supportive care.  consider alternative causes of abdominal pain/TTP if clinically indicated.  if no significant pain or physical exam findings and medical workup negative, can be cleared from an acute toxicologic emergency.  -Will continue 1:1 CO

## 2020-12-24 NOTE — ED ADULT NURSE NOTE - CHIEF COMPLAINT QUOTE
Pt. arrives via EMS after drinking about 1/4 bottle of hydrogen peroxide. Endorses nausea. Denies vomiting, homicide ideation, auditory/visual hallucinations. Calm and cooperative at present. Awake & alert. Pt. evaluated by MD Irvin, per MD, pt. to be seen in main ED.

## 2020-12-24 NOTE — CONSULT NOTE ADULT - SUBJECTIVE AND OBJECTIVE BOX
HPI: The patient is a 50 -year-old female  with past medical history of probably psychiatric disorder and a previous admission for possible catatonia who presented to the emergency department after taking 6-8 fluid ounces of over the counter hydrogen peroxide (3%). She initially vomited after she first did it. Now she has nausea and endorses epigastric pain.   50 y F took regular over the counter hydrogen peroxide. Threw up a bunch when she first did it, now she feels week and stomach hurts.   HELENA: ptp  MEDS:  Wt:  VS: p70 155/100 rr18 97.5F 100RA  PE (as per hospital staff): ttp in the belly, otherwise normal  EKG: pending  LABS: pending  IMAGING:    INTERVENTIONS:   This is a remote toxicology consult note. Information was obtained from the chart and via telecommunication with physicians and/or other staff at the treating facility. If a physical exam is documented, the results of the exam was relayed to the consulting toxicology service and subsequently relevant to a toxicologic assessment and treatment recommendations.  Furthermore, the toxicologic assessment and recommendations were given in real time at the initial time of consult.      HPI: The patient is a 50 -year-old female  with past medical history of probably psychiatric disorder and a previous admission for possible catatonia who presented to the emergency department after taking 6-8 fluid ounces of over the counter hydrogen peroxide (3%). She initially vomited after she first did it. Now she has nausea and endorses epigastric pain.   50 y F took regular over the counter hydrogen peroxide. Threw up a bunch when she first did it, now she feels week and stomach hurts.   HELENA: ptp  MEDS:  Wt:  VS: p70 155/100 rr18 97.5F 100RA  PE (as per hospital staff): ttp in the belly, otherwise normal  EKG: pending  LABS: pending  IMAGING:    INTERVENTIONS:

## 2020-12-24 NOTE — ED BEHAVIORAL HEALTH ASSESSMENT NOTE - NSSUICPROTFACT_PSY_ALL_CORE
Responsibility to children, family, or others/Identifies reasons for living/Supportive social network of family or friends

## 2020-12-24 NOTE — ED BEHAVIORAL HEALTH ASSESSMENT NOTE - OTHER
family sisters not evaluated; in bed poverty of thought on leave; awaiting disability flat issues with primary support, occupational issues refused to participate

## 2020-12-25 ENCOUNTER — TRANSCRIPTION ENCOUNTER (OUTPATIENT)
Age: 50
End: 2020-12-25

## 2020-12-25 VITALS
DIASTOLIC BLOOD PRESSURE: 76 MMHG | HEART RATE: 78 BPM | RESPIRATION RATE: 17 BRPM | TEMPERATURE: 98 F | SYSTOLIC BLOOD PRESSURE: 118 MMHG | OXYGEN SATURATION: 100 %

## 2020-12-25 LAB
ANION GAP SERPL CALC-SCNC: 11 MMOL/L — SIGNIFICANT CHANGE UP (ref 7–14)
BASOPHILS # BLD AUTO: 0.01 K/UL — SIGNIFICANT CHANGE UP (ref 0–0.2)
BASOPHILS NFR BLD AUTO: 0.2 % — SIGNIFICANT CHANGE UP (ref 0–2)
BUN SERPL-MCNC: 12 MG/DL — SIGNIFICANT CHANGE UP (ref 7–23)
CALCIUM SERPL-MCNC: 9.3 MG/DL — SIGNIFICANT CHANGE UP (ref 8.4–10.5)
CHLORIDE SERPL-SCNC: 104 MMOL/L — SIGNIFICANT CHANGE UP (ref 98–107)
CO2 SERPL-SCNC: 26 MMOL/L — SIGNIFICANT CHANGE UP (ref 22–31)
CREAT SERPL-MCNC: 0.67 MG/DL — SIGNIFICANT CHANGE UP (ref 0.5–1.3)
EOSINOPHIL # BLD AUTO: 0.08 K/UL — SIGNIFICANT CHANGE UP (ref 0–0.5)
EOSINOPHIL NFR BLD AUTO: 1.2 % — SIGNIFICANT CHANGE UP (ref 0–6)
GLUCOSE SERPL-MCNC: 69 MG/DL — LOW (ref 70–99)
HCT VFR BLD CALC: 32.2 % — LOW (ref 34.5–45)
HGB BLD-MCNC: 10.9 G/DL — LOW (ref 11.5–15.5)
IANC: 4.82 K/UL — SIGNIFICANT CHANGE UP (ref 1.5–8.5)
IMM GRANULOCYTES NFR BLD AUTO: 0.3 % — SIGNIFICANT CHANGE UP (ref 0–1.5)
LYMPHOCYTES # BLD AUTO: 1.23 K/UL — SIGNIFICANT CHANGE UP (ref 1–3.3)
LYMPHOCYTES # BLD AUTO: 19.1 % — SIGNIFICANT CHANGE UP (ref 13–44)
MAGNESIUM SERPL-MCNC: 2 MG/DL — SIGNIFICANT CHANGE UP (ref 1.6–2.6)
MCHC RBC-ENTMCNC: 28.1 PG — SIGNIFICANT CHANGE UP (ref 27–34)
MCHC RBC-ENTMCNC: 33.9 GM/DL — SIGNIFICANT CHANGE UP (ref 32–36)
MCV RBC AUTO: 83 FL — SIGNIFICANT CHANGE UP (ref 80–100)
MONOCYTES # BLD AUTO: 0.29 K/UL — SIGNIFICANT CHANGE UP (ref 0–0.9)
MONOCYTES NFR BLD AUTO: 4.5 % — SIGNIFICANT CHANGE UP (ref 2–14)
NEUTROPHILS # BLD AUTO: 4.82 K/UL — SIGNIFICANT CHANGE UP (ref 1.8–7.4)
NEUTROPHILS NFR BLD AUTO: 74.7 % — SIGNIFICANT CHANGE UP (ref 43–77)
NRBC # BLD: 0 /100 WBCS — SIGNIFICANT CHANGE UP
NRBC # FLD: 0 K/UL — SIGNIFICANT CHANGE UP
PHOSPHATE SERPL-MCNC: 3.4 MG/DL — SIGNIFICANT CHANGE UP (ref 2.5–4.5)
PLATELET # BLD AUTO: 199 K/UL — SIGNIFICANT CHANGE UP (ref 150–400)
POTASSIUM SERPL-MCNC: 3.4 MMOL/L — LOW (ref 3.5–5.3)
POTASSIUM SERPL-SCNC: 3.4 MMOL/L — LOW (ref 3.5–5.3)
RBC # BLD: 3.88 M/UL — SIGNIFICANT CHANGE UP (ref 3.8–5.2)
RBC # FLD: 13.4 % — SIGNIFICANT CHANGE UP (ref 10.3–14.5)
SODIUM SERPL-SCNC: 141 MMOL/L — SIGNIFICANT CHANGE UP (ref 135–145)
WBC # BLD: 6.45 K/UL — SIGNIFICANT CHANGE UP (ref 3.8–10.5)
WBC # FLD AUTO: 6.45 K/UL — SIGNIFICANT CHANGE UP (ref 3.8–10.5)

## 2020-12-25 PROCEDURE — 99233 SBSQ HOSP IP/OBS HIGH 50: CPT

## 2020-12-25 RX ORDER — POTASSIUM CHLORIDE 20 MEQ
40 PACKET (EA) ORAL ONCE
Refills: 0 | Status: COMPLETED | OUTPATIENT
Start: 2020-12-25 | End: 2020-12-25

## 2020-12-25 NOTE — DISCHARGE NOTE PROVIDER - CARE PROVIDER_API CALL
Neurology Clinic,   [address] [place] [address]  Phone: [phone]  Fax: (   )    -  Follow Up Time:

## 2020-12-25 NOTE — BH CONSULTATION LIAISON PROGRESS NOTE - CURRENT MEDICATION
MEDICATIONS  (STANDING):  enoxaparin Injectable 40 milliGRAM(s) SubCutaneous daily  potassium chloride    Tablet ER 40 milliEquivalent(s) Oral once    MEDICATIONS  (PRN):  acetaminophen   Tablet .. 650 milliGRAM(s) Oral every 6 hours PRN Temp greater or equal to 38C (100.4F), Mild Pain (1 - 3), Moderate Pain (4 - 6), Severe Pain (7 - 10)  ondansetron Injectable 4 milliGRAM(s) IV Push every 6 hours PRN Nausea and/or Vomiting  
MEDICATIONS  (STANDING):  enoxaparin Injectable 40 milliGRAM(s) SubCutaneous daily    MEDICATIONS  (PRN):  acetaminophen   Tablet .. 650 milliGRAM(s) Oral every 6 hours PRN Temp greater or equal to 38C (100.4F), Mild Pain (1 - 3), Moderate Pain (4 - 6), Severe Pain (7 - 10)  ondansetron Injectable 4 milliGRAM(s) IV Push every 6 hours PRN Nausea and/or Vomiting

## 2020-12-25 NOTE — DISCHARGE NOTE PROVIDER - PROVIDER TOKENS
FREE:[LAST:[Neurology Clinic],PHONE:[phone]],FAX:[(   )    -],ADDRESS:[[address] [place] [address]]]

## 2020-12-25 NOTE — PROGRESS NOTE ADULT - PROBLEM SELECTOR PLAN 1
Currently asymptomatic, denies any abd pain.   -Toxicology consult appreciated: CT A/P if pain is persistent.  rule out coingestions.  pain control.  supportive care.  consider alternative causes of abdominal pain/TTP if clinically indicated.  if no significant pain or physical exam findings and medical workup negative, can be cleared from an acute toxicologic emergency.  -Will continue 1:1 CO  -F/U Psych rec.

## 2020-12-25 NOTE — BH CONSULTATION LIAISON PROGRESS NOTE - NSICDXBHPRIMARYDX_PSY_ALL_CORE
Major depressive disorder with psychotic features   F32.3  
Major depressive disorder with psychotic features   F32.3

## 2020-12-25 NOTE — PROGRESS NOTE ADULT - ASSESSMENT
49yo Female with no significant PMH and no reported past psychiatric history presents after ingesting about 6 to 8 oz of over the counter pharmacy grade hydrogen peroxide yesterday

## 2020-12-25 NOTE — BH CONSULTATION LIAISON PROGRESS NOTE - NSBHCONSFOLLOWNEEDS_PSY_ALL_CORE
No psychiatric contraindications to discharge
Needs further psych safety assessment prior to discharge

## 2020-12-25 NOTE — BH CONSULTATION LIAISON PROGRESS NOTE - NSICDXBHSECONDARYDX_PSY_ALL_CORE
MDD (major depressive disorder), single episode, severe with psychotic features   F32.3  
Altered mental status   R41.82

## 2020-12-25 NOTE — BH CONSULTATION LIAISON PROGRESS NOTE - NSBHCHARTREVIEWVS_PSY_A_CORE FT
Vital Signs Last 24 Hrs  T(C): 36.7 (25 Dec 2020 15:01), Max: 37 (25 Dec 2020 07:15)  T(F): 98 (25 Dec 2020 15:01), Max: 98.6 (25 Dec 2020 07:15)  HR: 78 (25 Dec 2020 15:01) (75 - 83)  BP: 118/76 (25 Dec 2020 15:01) (118/76 - 136/88)  BP(mean): --  RR: 17 (25 Dec 2020 15:01) (16 - 17)  SpO2: 100% (25 Dec 2020 15:01) (100% - 100%)
Vital Signs Last 24 Hrs  T(C): 37 (25 Dec 2020 07:15), Max: 37 (25 Dec 2020 07:15)  T(F): 98.6 (25 Dec 2020 07:15), Max: 98.6 (25 Dec 2020 07:15)  HR: 83 (25 Dec 2020 07:15) (75 - 84)  BP: 121/83 (25 Dec 2020 07:15) (121/83 - 136/88)  BP(mean): --  RR: 16 (25 Dec 2020 07:15) (16 - 18)  SpO2: 100% (25 Dec 2020 07:15) (100% - 100%)

## 2020-12-25 NOTE — CHART NOTE - NSCHARTNOTEFT_GEN_A_CORE
Neuro consulted, will follow recs.     ACP  [id number]
Notified by RN pt wants to leave against medical advice. Spoke with pt at bedside about acuity of event and reoccurrence and that pt necessitates further w/u (e.g. possibly EEG, MRI head). Pt understands the risks (including possible death) and benefits. Pt refusing further hospitalization. Psych follow up noted, pt with capacity.    AMA paperwork filled out, in chart. Attending, Dr. Roberts notified.

## 2020-12-25 NOTE — BH CONSULTATION LIAISON PROGRESS NOTE - NSBHASSESSMENTFT_PSY_ALL_CORE
Patient is a 50 year old  female with 4 adult children. Patient currently awaiting disability paperwork from job as an . No formal psychiatric history. No hx of suicide attempts. No history of inpatient admissions. Patient was admitted 3/9/20 x 1 day after an incident of unresponsiveness/AMS which was determined to have no medical etiology and patient was referred to [facility] crisis clinic for followup. No hx of aggression/violence, legal issues or substance abuse issues. No PMH. BIBA referred by family for hydrogen peroxide ingestion.    Patient presents to the ED in the context of hydrogen peroxide ingestion. Patient's ingestion does not appear to have been purposeful based on patient and family report however it shows a level of disorganization by the patient due to her current mental status. She is not eating or sleeping, has lost weight and is not functioning requiring a leave from her job. She is minimally responsive with answers and has decreased productivity of speech.     Patient presented in March 2020 for a similar presentation and was admitted to medicine x 1 day. Her symptoms suddenly resolved and she was discharged. She appeared to have functioned until end of November 2020 and now again is presenting with AMS. No medical workup was done during last inpatient admission ie: LP, MRI due to her rapid resolution of symptoms. Based on her age and repeated symptoms with previous rapid resolution of symptoms it would benefit patient to have further medical work up to r/o medical etiology.     12/25: pt aaox3, though reports depressed mood x months. Also w/ AH and suspected paranoid thought content. Overall unable to clarify whether ingestion was SA either 2/2 pt disorganized thought process vs evasiveness. Continue w/u to r/o psychosis 2/2 general medical condition. If w/u unremarkable, would start tx for major depressive episode w/ psychotic features.  
STANDARD FOR CAPACITY: Pt's ability to make a meaningful decision about whether or not to participate.  Required 4 elements: 1)Understanding (ability to comprehend the disclosed information about the nature and purpose of the study, the procedures involved, as well as risks / benefits of treatment/non-treatment/participating/not participating); 2)Appreciation, i.e., the ability to appreciate the significance of the disclosed information & the potential risks and benefits for one’s own situation and condition; 3)Reasoning (ability to engage in a reasoning process about the risks and benefits of participating versus alternatives); 4)Ability to express a choice about whether or not to participate.  Patient at this time has capacity make her medical decision including leaving AMA  - no grounds to hold her against her will; does not meet criteria for involuntary psychiatric admission  - family very supportive and are also advocating Patient to be discharged today

## 2020-12-25 NOTE — BH CONSULTATION LIAISON PROGRESS NOTE - CASE SUMMARY
Patient is a 50 year old  female with 4 adult children, awaiting disability paperwork from job as an . No formal psychiatric history. No hx of suicide attempts. No history of inpatient admissions. Patient was admitted 3/9/20 x 1 day after an incident of unresponsiveness/AMS which was determined to have no medical etiology and patient was referred to [facility] crisis clinic for followup. No hx of aggression/violence, legal issues or substance abuse issues. No PMH. BIBA referred by family for hydrogen peroxide ingestion. Pt denies her ingestion was a suicide attempt and attributes it to confusion. She appeared to be guarded during the interval, endorsed hearing AH of noises but could not elaborate on them except that they had meaning for her. Will complete new onset psychosis w/u prior to starting an antipsychotic. Continue CO 1:1 observation for safety/suicidality.

## 2020-12-25 NOTE — BH CONSULTATION LIAISON PROGRESS NOTE - NSBHMSESPEECH_PSY_A_CORE
Abnormal as indicated, otherwise normal...
Normal volume, rate, productivity, spontaneity and articulation

## 2020-12-25 NOTE — DISCHARGE NOTE PROVIDER - HOSPITAL COURSE
50 F no PMHx presents after ingesting 6-8 oz of OTC hydrogen peroxide. Placed on 1:1 for concern for suicidal attempt. Toxicology consulted, recommend CT A/P if with persistent pain. Psych consulted. Pt monitored and remained hemodynamically stable.     Spoke with attending, Dr. Roberts, pt is medically   50 F no PMHx presents after ingesting 6-8 oz of OTC hydrogen peroxide. Placed on 1:1 for concern for suicidal attempt. Toxicology consulted, recommend CT A/P if with persistent pain. Psych consulted. Pt refused further in-hospitalization. Psych consulted, pt with capacity. SIGNED OUT AMA. Attending, Dr. Armando mendez

## 2020-12-25 NOTE — PROGRESS NOTE ADULT - SUBJECTIVE AND OBJECTIVE BOX
Patient is a 50y old  Female who presents with a chief complaint of ingested peroxide (24 Dec 2020 15:26)      SUBJECTIVE / OVERNIGHT EVENTS: Pt seen and examined, chart reviewed. No complaints at this time, denies any abd pain, N/V.     MEDICATIONS  (STANDING):  enoxaparin Injectable 40 milliGRAM(s) SubCutaneous daily    MEDICATIONS  (PRN):  acetaminophen   Tablet .. 650 milliGRAM(s) Oral every 6 hours PRN Temp greater or equal to 38C (100.4F), Mild Pain (1 - 3), Moderate Pain (4 - 6), Severe Pain (7 - 10)  ondansetron Injectable 4 milliGRAM(s) IV Push every 6 hours PRN Nausea and/or Vomiting      Vital Signs Last 24 Hrs  T(C): 37 (25 Dec 2020 07:15), Max: 37 (25 Dec 2020 07:15)  T(F): 98.6 (25 Dec 2020 07:15), Max: 98.6 (25 Dec 2020 07:15)  HR: 83 (25 Dec 2020 07:15) (75 - 83)  BP: 121/83 (25 Dec 2020 07:15) (121/83 - 136/88)  BP(mean): --  RR: 16 (25 Dec 2020 07:15) (16 - 18)  SpO2: 100% (25 Dec 2020 07:15) (100% - 100%)  CAPILLARY BLOOD GLUCOSE        I&O's Summary      PHYSICAL EXAM:  GENERAL: NAD, well-developed  HEAD:  Atraumatic, Normocephalic  EYES: EOMI, PERRLA, conjunctiva and sclera clear  NECK: Supple, No JVD  CHEST/LUNG: Clear to auscultation bilaterally; No wheeze  HEART: Regular rate and rhythm; No murmurs, rubs, or gallops  ABDOMEN: Soft, Nontender, Nondistended; Bowel sounds present  EXTREMITIES:  2+ Peripheral Pulses, No clubbing, cyanosis, or edema  PSYCH: AAOx3  NEUROLOGY: non-focal  SKIN: No rashes or lesions    LABS:                        10.9   6.45  )-----------( 199      ( 25 Dec 2020 08:48 )             32.2     12-25    141  |  104  |  12  ----------------------------<  69<L>  3.4<L>   |  26  |  0.67    Ca    9.3      25 Dec 2020 08:48  Phos  3.4     12-25  Mg     2.0     12-25    TPro  7.3  /  Alb  3.9  /  TBili  0.4  /  DBili  x   /  AST  21  /  ALT  14  /  AlkPhos  38<L>  12-24          Urinalysis Basic - ( 24 Dec 2020 10:18 )    Color: Light Yellow / Appearance: Clear / S.016 / pH: x  Gluc: x / Ketone: Negative  / Bili: Negative / Urobili: <2 mg/dL   Blood: x / Protein: Trace / Nitrite: Negative   Leuk Esterase: Moderate / RBC: 1 /HPF / WBC 3 /HPF   Sq Epi: x / Non Sq Epi: 2 /HPF / Bacteria: Negative        RADIOLOGY & ADDITIONAL TESTS:    Imaging Personally Reviewed:  < from: CT Head No Cont (20 @ 10:32) >    Impression:    No acute hemorrhage, hydrocephalus or midline shift.                    SHIRLEY CM MD;Attending Radiologist  This document has been electronically signed. Dec 24 2020 11:45AM    < end of copied text >    Consultant(s) Notes Reviewed:      Care Discussed with Consultants/Other Providers:

## 2020-12-25 NOTE — BH CONSULTATION LIAISON PROGRESS NOTE - NSBHCHARTREVIEWLAB_PSY_A_CORE FT
cpComprehensive Metabolic Panel (12.24.20 @ 08:01)   Sodium, Serum: 145 mmol/L   Potassium, Serum: 3.8 mmol/L   Chloride, Serum: 107 mmol/L   Carbon Dioxide, Serum: 31 mmol/L   Anion Gap, Serum: 7 mmol/L   Blood Urea Nitrogen, Serum: 8 mg/dL   Creatinine, Serum: 0.65 mg/dL   Glucose, Serum: 100 mg/dL   Calcium, Total Serum: 9.4 mg/dL   Protein Total, Serum: 7.3 g/dL   Albumin, Serum: 3.9 g/dL   Bilirubin Total, Serum: 0.4 mg/dL   Alkaline Phosphatase, Serum: 38 U/L   Aspartate Aminotransferase (AST/SGOT): 21 U/L   Alanine Aminotransferase (ALT/SGPT): 14 U/L   eGFR if Non : 104:  Complete Blood Count + Automated Diff in AM (12.25.20 @ 08:48)   Nucleated RBC #: 0.00 K/uL   IANC: 4.82: IANC (instrument absolute neutrophil count) is based on the instrument   calculation which may differ from ANC (manual absolute neutrophil count)   since it is based on the calculation from a manual differential. K/uL   WBC Count: 6.45 K/uL   RBC Count: 3.88 M/uL   Hemoglobin: 10.9 g/dL   Hematocrit: 32.2 %   Mean Cell Volume: 83.0 fL   Mean Cell Hemoglobin: 28.1 pg   Mean Cell Hemoglobin Conc: 33.9 gm/dL   Red Cell Distrib Width: 13.4 %   Platelet Count - Automated: 199 K/uL   Auto Neutrophil #: 4.82 K/uL   Auto Lymphocyte #: 1.23 K/uL   Auto Monocyte #: 0.29 K/uL   Auto Eosinophil #: 0.08 K/uL   Auto Basophil #: 0.01 K/uL   Auto Neutrophil %: 74.7: Differential percentages must be correlated with absolute numbers for   clinical significance. %   Auto Lymphocyte %: 19.1 %   Auto Monocyte %: 4.5 %   Auto Eosinophil %: 1.2 %   Auto Basophil %: 0.2 %   Auto Immature Granulocyte %: 0.3: (Includes meta, myelo and promyelocytes) %   Nucleated RBC: 0 /100 WBCs

## 2020-12-25 NOTE — DISCHARGE NOTE PROVIDER - NSDCCPCAREPLAN_GEN_ALL_CORE_FT
PRINCIPAL DISCHARGE DIAGNOSIS  Diagnosis: Catatonia  Assessment and Plan of Treatment: You were seen by Psychiatry in-hospital. You were recommended for further medical workup in-hospital. You have refused for continued hospitalization for workup. SIGNED OUT AGAINST MEDICAL ADVICE. Follow up outpatient PCP and/or Neurology clinic in 1-2 weeks for further management and workup as warranted.      SECONDARY DISCHARGE DIAGNOSES  Diagnosis: Suicidal behavior  Assessment and Plan of Treatment: You were seen by Psychiatry in-hospital. You were recommended for further medical workup in-hospital. You have refused for continued hospitalization for workup. SIGNED OUT AGAINST MEDICAL ADVICE. Follow up outpatient PCP and/or Neurology clinic in 1-2 weeks for further management and workup as warranted.     PRINCIPAL DISCHARGE DIAGNOSIS  Diagnosis: Catatonia  Assessment and Plan of Treatment: You were seen by Psychiatry in-hospital. You were recommended for further medical workup in-hospital. You have refused for continued hospitalization for workup. SIGNED OUT AGAINST MEDICAL ADVICE. Follow up outpatient PCP and/or Neurology clinic in 1-2 weeks for further management and workup as warranted.      SECONDARY DISCHARGE DIAGNOSES  Diagnosis: Hypokalemia  Assessment and Plan of Treatment: You have refused potassium supplementation understanding risks and benefits. K 3.4 on labwork 12/25. Please follow up outpatient provider in 1 week for repeat BMP to monitor and further management.    Diagnosis: Suicidal behavior  Assessment and Plan of Treatment: You were seen by Psychiatry in-hospital. You were recommended for further medical workup in-hospital. You have refused for continued hospitalization for workup. SIGNED OUT AGAINST MEDICAL ADVICE. Follow up outpatient PCP and/or Neurology clinic in 1-2 weeks for further management and workup as warranted.

## 2020-12-25 NOTE — BH CONSULTATION LIAISON PROGRESS NOTE - NSBHFUPINTERVALHXFT_PSY_A_CORE
Chart reviewed. No PRNs required overnight.    On evaluation today, pt reports that she feels better and less confused. She says she has been confused on and off for 7 months. She says she has been depressed on and off for about 8 months. She says that yesterday she drank hydrogen peroxide because she felt "overwhelmed." When asked if it was a suicide attempt, she says "maybe not." She reportedly feels hopeful for the upcoming new year. She says she hasn't been eating well for a while and has lost a lot of weight. Pt reports she is unable to function at home and can't be alone, but isn't able to elaborate aside from saying she just "sits around thinking" all day. Endorses AH of assorted noises she does not elaborate upon but believes are "a sign" of something. Denies hearing voices. Reports intermittently feeling unsafe but does not elaborate. Reports fair sleep. Denies substance use. 
Patient requested to be discharge prompting a capacity evaluation. Patient was evaluated by Attending (Writer) and psychiatry resident at bedside. Patient's daughter and later her sister both present. Patient was calm, cooperative, engaged appropriately, gave a reliable narrative that was confirmed by her daughter/family. Patient at this time feels like she feels much better, realizes she needs to learn better stress reduction techniques/coping strategies and would feels therapy would help. Info on how to obtain referral to outpatient therapist provided. Mental status was unremarkable at this time. Patient endorses stable euthymic mood. Denies any symptoms of hypomania/rancho/psychosis/major depression/panic. Denies any active or passive suicidal or homicidal ideation. Names protective factors (rell; family; hope for future). Denies substance use; denies access to guns.

## 2021-03-02 ENCOUNTER — EMERGENCY (EMERGENCY)
Facility: HOSPITAL | Age: 51
LOS: 1 days | Discharge: HOME | End: 2021-03-02
Admitting: EMERGENCY MEDICINE
Payer: COMMERCIAL

## 2021-03-02 VITALS
TEMPERATURE: 99 F | RESPIRATION RATE: 16 BRPM | SYSTOLIC BLOOD PRESSURE: 134 MMHG | DIASTOLIC BLOOD PRESSURE: 76 MMHG | HEIGHT: 63 IN | OXYGEN SATURATION: 100 % | HEART RATE: 63 BPM

## 2021-03-02 DIAGNOSIS — Z98.891 HISTORY OF UTERINE SCAR FROM PREVIOUS SURGERY: Chronic | ICD-10-CM

## 2021-03-02 DIAGNOSIS — F29 UNSPECIFIED PSYCHOSIS NOT DUE TO A SUBSTANCE OR KNOWN PHYSIOLOGICAL CONDITION: ICD-10-CM

## 2021-03-02 LAB
ALBUMIN SERPL ELPH-MCNC: 4.3 G/DL — SIGNIFICANT CHANGE UP (ref 3.3–5)
ALP SERPL-CCNC: 44 U/L — SIGNIFICANT CHANGE UP (ref 40–120)
ALT FLD-CCNC: 12 U/L — SIGNIFICANT CHANGE UP (ref 4–33)
AMPHET UR-MCNC: NEGATIVE — SIGNIFICANT CHANGE UP
ANION GAP SERPL CALC-SCNC: 9 MMOL/L — SIGNIFICANT CHANGE UP (ref 7–14)
APAP SERPL-MCNC: <15 UG/ML — SIGNIFICANT CHANGE UP (ref 15–25)
APPEARANCE UR: CLEAR — SIGNIFICANT CHANGE UP
AST SERPL-CCNC: 15 U/L — SIGNIFICANT CHANGE UP (ref 4–32)
BACTERIA # UR AUTO: NEGATIVE — SIGNIFICANT CHANGE UP
BARBITURATES UR SCN-MCNC: NEGATIVE — SIGNIFICANT CHANGE UP
BASOPHILS # BLD AUTO: 0.02 K/UL — SIGNIFICANT CHANGE UP (ref 0–0.2)
BASOPHILS NFR BLD AUTO: 0.3 % — SIGNIFICANT CHANGE UP (ref 0–2)
BENZODIAZ UR-MCNC: NEGATIVE — SIGNIFICANT CHANGE UP
BILIRUB SERPL-MCNC: 0.5 MG/DL — SIGNIFICANT CHANGE UP (ref 0.2–1.2)
BILIRUB UR-MCNC: NEGATIVE — SIGNIFICANT CHANGE UP
BUN SERPL-MCNC: 13 MG/DL — SIGNIFICANT CHANGE UP (ref 7–23)
CALCIUM SERPL-MCNC: 10 MG/DL — SIGNIFICANT CHANGE UP (ref 8.4–10.5)
CHLORIDE SERPL-SCNC: 105 MMOL/L — SIGNIFICANT CHANGE UP (ref 98–107)
CO2 SERPL-SCNC: 30 MMOL/L — SIGNIFICANT CHANGE UP (ref 22–31)
COCAINE METAB.OTHER UR-MCNC: NEGATIVE — SIGNIFICANT CHANGE UP
COLOR SPEC: YELLOW — SIGNIFICANT CHANGE UP
CREAT SERPL-MCNC: 0.84 MG/DL — SIGNIFICANT CHANGE UP (ref 0.5–1.3)
CREATININE URINE RESULT, DAU: 174 MG/DL — SIGNIFICANT CHANGE UP
DIFF PNL FLD: NEGATIVE — SIGNIFICANT CHANGE UP
EOSINOPHIL # BLD AUTO: 0.06 K/UL — SIGNIFICANT CHANGE UP (ref 0–0.5)
EOSINOPHIL NFR BLD AUTO: 0.8 % — SIGNIFICANT CHANGE UP (ref 0–6)
EPI CELLS # UR: 1 /HPF — SIGNIFICANT CHANGE UP (ref 0–5)
ETHANOL SERPL-MCNC: <10 MG/DL — SIGNIFICANT CHANGE UP
GLUCOSE SERPL-MCNC: 96 MG/DL — SIGNIFICANT CHANGE UP (ref 70–99)
GLUCOSE UR QL: NEGATIVE — SIGNIFICANT CHANGE UP
HCG UR QL: NEGATIVE — SIGNIFICANT CHANGE UP
HCT VFR BLD CALC: 34.3 % — LOW (ref 34.5–45)
HGB BLD-MCNC: 11.3 G/DL — LOW (ref 11.5–15.5)
HYALINE CASTS # UR AUTO: 1 /LPF — SIGNIFICANT CHANGE UP (ref 0–7)
IANC: 5.06 K/UL — SIGNIFICANT CHANGE UP (ref 1.5–8.5)
IMM GRANULOCYTES NFR BLD AUTO: 0.3 % — SIGNIFICANT CHANGE UP (ref 0–1.5)
KETONES UR-MCNC: NEGATIVE — SIGNIFICANT CHANGE UP
LEUKOCYTE ESTERASE UR-ACNC: NEGATIVE — SIGNIFICANT CHANGE UP
LYMPHOCYTES # BLD AUTO: 2.07 K/UL — SIGNIFICANT CHANGE UP (ref 1–3.3)
LYMPHOCYTES # BLD AUTO: 26.8 % — SIGNIFICANT CHANGE UP (ref 13–44)
MCHC RBC-ENTMCNC: 27.6 PG — SIGNIFICANT CHANGE UP (ref 27–34)
MCHC RBC-ENTMCNC: 32.9 GM/DL — SIGNIFICANT CHANGE UP (ref 32–36)
MCV RBC AUTO: 83.9 FL — SIGNIFICANT CHANGE UP (ref 80–100)
METHADONE UR-MCNC: NEGATIVE — SIGNIFICANT CHANGE UP
MONOCYTES # BLD AUTO: 0.49 K/UL — SIGNIFICANT CHANGE UP (ref 0–0.9)
MONOCYTES NFR BLD AUTO: 6.3 % — SIGNIFICANT CHANGE UP (ref 2–14)
NEUTROPHILS # BLD AUTO: 5.06 K/UL — SIGNIFICANT CHANGE UP (ref 1.8–7.4)
NEUTROPHILS NFR BLD AUTO: 65.5 % — SIGNIFICANT CHANGE UP (ref 43–77)
NITRITE UR-MCNC: NEGATIVE — SIGNIFICANT CHANGE UP
NRBC # BLD: 0 /100 WBCS — SIGNIFICANT CHANGE UP
NRBC # FLD: 0 K/UL — SIGNIFICANT CHANGE UP
OPIATES UR-MCNC: NEGATIVE — SIGNIFICANT CHANGE UP
OXYCODONE UR-MCNC: NEGATIVE — SIGNIFICANT CHANGE UP
PCP SPEC-MCNC: SIGNIFICANT CHANGE UP
PCP UR-MCNC: NEGATIVE — SIGNIFICANT CHANGE UP
PH UR: 6.5 — SIGNIFICANT CHANGE UP (ref 5–8)
PLATELET # BLD AUTO: 238 K/UL — SIGNIFICANT CHANGE UP (ref 150–400)
POTASSIUM SERPL-MCNC: 3.1 MMOL/L — LOW (ref 3.5–5.3)
POTASSIUM SERPL-SCNC: 3.1 MMOL/L — LOW (ref 3.5–5.3)
PROT SERPL-MCNC: 7.9 G/DL — SIGNIFICANT CHANGE UP (ref 6–8.3)
PROT UR-MCNC: ABNORMAL
RBC # BLD: 4.09 M/UL — SIGNIFICANT CHANGE UP (ref 3.8–5.2)
RBC # FLD: 12.9 % — SIGNIFICANT CHANGE UP (ref 10.3–14.5)
RBC CASTS # UR COMP ASSIST: 2 /HPF — SIGNIFICANT CHANGE UP (ref 0–4)
SALICYLATES SERPL-MCNC: <5 MG/DL — LOW (ref 15–30)
SARS-COV-2 RNA SPEC QL NAA+PROBE: DETECTED
SODIUM SERPL-SCNC: 144 MMOL/L — SIGNIFICANT CHANGE UP (ref 135–145)
SP GR SPEC: 1.02 — SIGNIFICANT CHANGE UP (ref 1.01–1.02)
THC UR QL: NEGATIVE — SIGNIFICANT CHANGE UP
TOXICOLOGY SCREEN, DRUGS OF ABUSE, SERUM RESULT: SIGNIFICANT CHANGE UP
TSH SERPL-MCNC: 1.24 UIU/ML — SIGNIFICANT CHANGE UP (ref 0.27–4.2)
UROBILINOGEN FLD QL: ABNORMAL
WBC # BLD: 7.72 K/UL — SIGNIFICANT CHANGE UP (ref 3.8–10.5)
WBC # FLD AUTO: 7.72 K/UL — SIGNIFICANT CHANGE UP (ref 3.8–10.5)
WBC UR QL: 2 /HPF — SIGNIFICANT CHANGE UP (ref 0–5)

## 2021-03-02 PROCEDURE — 90792 PSYCH DIAG EVAL W/MED SRVCS: CPT

## 2021-03-02 PROCEDURE — 93010 ELECTROCARDIOGRAM REPORT: CPT

## 2021-03-02 PROCEDURE — 99285 EMERGENCY DEPT VISIT HI MDM: CPT | Mod: 25

## 2021-03-02 RX ORDER — POTASSIUM CHLORIDE 20 MEQ
40 PACKET (EA) ORAL ONCE
Refills: 0 | Status: COMPLETED | OUTPATIENT
Start: 2021-03-02 | End: 2021-03-02

## 2021-03-02 NOTE — ED BEHAVIORAL HEALTH ASSESSMENT NOTE - DESCRIPTION
none see hpi During course of ED visit patient was calm and cooperative. Patient was not aggressive or violent and did not require PRN medications.    Vital Signs Last 24 Hrs  T(C): 36.4 (24 Dec 2020 06:32), Max: 36.4 (24 Dec 2020 06:32)  T(F): 97.5 (24 Dec 2020 06:32), Max: 97.5 (24 Dec 2020 06:32)  HR: 70 (24 Dec 2020 06:32) (70 - 70)  BP: 155/100 (24 Dec 2020 06:32) (155/100 - 155/100)  BP(mean): --  RR: 18 (24 Dec 2020 06:32) (18 - 18)  SpO2: 100% (24 Dec 2020 06:32) (100% - 100%) During course of ED visit patient was calm and in good behavioral control. Patient was not aggressive or violent and did not require PRN medications.    Vital Signs Last 24 Hrs  T(C): 37 (02 Mar 2021 15:40), Max: 37 (02 Mar 2021 15:40)  T(F): 98.6 (02 Mar 2021 15:40), Max: 98.6 (02 Mar 2021 15:40)  HR: 63 (02 Mar 2021 15:40) (63 - 63)  BP: 134/76 (02 Mar 2021 15:40) (134/76 - 134/76)  BP(mean): --  RR: 16 (02 Mar 2021 15:40) (16 - 16)  SpO2: 100% (02 Mar 2021 15:40) (100% - 100%)
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
## 2021-03-02 NOTE — ED BEHAVIORAL HEALTH ASSESSMENT NOTE - OTHER PAST PSYCHIATRIC HISTORY (INCLUDE DETAILS REGARDING ONSET, COURSE OF ILLNESS, INPATIENT/OUTPATIENT TREATMENT)
No formal psychiatric history. No hx of suicide attempts. No history of inpatient admissions. Patient was admitted 3/9/20 x 1 day after an incident of unresponsiveness/AMS which was determined to have no medical etiology and patient was referred to [facility] crisis clinic for followup. see HPI

## 2021-03-02 NOTE — ED ADULT TRIAGE NOTE - SPO2 (%)
Patient: Sydney Ziegler                MRN: [id number]       SSN: [id number]  YOB: 1943        AGE: 68 y.o. SEX: female  Body mass index is 37.12 kg/m². PCP: Yeni Mcgarry MD  11/22/19    Chief Complaint   Patient presents with    Knee Pain     isaias knee pain, f/u appt. HISTORY:  Sydney Ziegler is a 68 y.o. female who is seen for bilateral knee pain. ICD-10-CM ICD-9-CM    1. Primary osteoarthritis of left knee M17.12 715.16 [place] DRAIN/INJECT LARGE JOINT/BURSA      EUFLEXXA INJECTION PER DOSE      sodium hyaluronate (SUPARTZ FX/HYALGAN/GENIVSC) 10 mg/mL syrg injection   2. Chronic pain of left knee M25.562 719.46 AZ DRAIN/INJECT LARGE JOINT/BURSA    G89.29 338.29 EUFLEXXA INJECTION PER DOSE      sodium hyaluronate (SUPARTZ FX/HYALGAN/GENIVSC) 10 mg/mL syrg injection   3. Primary osteoarthritis of right knee M17.11 715.16 [place] DRAIN/INJECT LARGE JOINT/BURSA      EUFLEXXA INJECTION PER DOSE      sodium hyaluronate (SUPARTZ FX/HYALGAN/GENIVSC) 10 mg/mL syrg injection   4.  Chronic pain of right knee M25.561 719.46 [place] DRAIN/INJECT LARGE JOINT/BURSA    G89.29 338.29 EUFLEXXA INJECTION PER DOSE      sodium hyaluronate (SUPARTZ FX/HYALGAN/GENIVSC) 10 mg/mL syrg injection       Chart reviewed for the following:   Izzy Fajardo MD, have reviewed the History, Physical and updated the Allergic reactions for [facility] performed immediately prior to start of procedure:  Izzy Fajardo MD, have performed the following reviews on Sydney Ziegler prior to the start of the procedure:            * Patient was identified by name and date of birth   * Agreement on procedure being performed was verified  * Risks and Benefits explained to the patient  * Procedure site verified and marked as necessary  * Patient was positioned for comfort  * Consent was obtained     Time: 11:31 AM     Date of procedure: 11/22/2019    Procedure performed by: Arsen Garcia MD    Ms. Paez tolerated the procedure well with no complications. PLAN:  After discussing treatment options, patient's knees were injected with 2 cc of Euflexxa. Ms. Lina Zaidi will follow up in one week to continue her visco supplementation injection series.       Scribed by Crystal Luke ([address] [place] [address] [address]) as dictated by Arsen Garcia MD [address]

## 2021-03-02 NOTE — ED ADULT TRIAGE NOTE - CHIEF COMPLAINT QUOTE
Pt with no prior psychiatric history brought in by her sister for paranoia, delusions refusing to eat, x 1 year.   Pt denies Si HI hallucinations, drug or alcohol use.  Pt denies there is anything wrong with her but states "I didn't used to be like this."  Breonna Bearden (sister) [phone]
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
## 2021-03-02 NOTE — ED ADULT NURSE NOTE - CHIEF COMPLAINT QUOTE
Pt with no prior psychiatric history brought in by her sister for paranoia, delusions refusing to eat, x 1 year.   Pt denies Si HI hallucinations, drug or alcohol use.  Pt denies there is anything wrong with her but states "I didn't used to be like this."  Breonna Bearden (sister) [phone]
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
## 2021-03-02 NOTE — ED BEHAVIORAL HEALTH ASSESSMENT NOTE - SAFETY PLAN ADDT'L DETAILS
Safety plan discussed with.../Education provided regarding environmental safety / lethal means restriction/Provision of National Suicide Prevention Lifeline 2-588-057-VNAM (3320)

## 2021-03-02 NOTE — ED BEHAVIORAL HEALTH ASSESSMENT NOTE - OTHER
sister flat not evaluated; in bed poverty of thought refused to participate guarded impoverished superficially cooperative, guarded chloea

## 2021-03-02 NOTE — ED BEHAVIORAL HEALTH ASSESSMENT NOTE - SUMMARY
Patient is a 50 year old  female, lives with  and 3 adult children, unemployed, No formal psychiatric history, evaluated in  ED 12/24/20 for paranoia and s/p ?accidental? ingestion of hydrogen peroxide (pt ultimately admitted to medicine for AMS and left AMA on 12/25/20), has never received outpatient psychiatric treatment, No hx of suicide attempts. No history of inpatient psych admissions, No hx of aggression/violence, no h/o legal issues or substance abuse issues. No PMH, brought in by sister for ongoing paranoia and decreased PO intake.   Though pt is paranoid with reduced PO intake, she continues to eat and attend to ADL's with encouragement. She also denies SI/HI, and she is calm, cooperative, and in good behavioral control. She denies wanting to act on her paranoia, and she has not been physically aggressive or threatening. She does not present an acute danger to self or others and does not meet criteria for involuntary psychiatric admission at this time. She declines voluntary psychiatric admission at this time.

## 2021-03-02 NOTE — ED ADULT NURSE REASSESSMENT NOTE - NS ED NURSE REASSESS COMMENT FT1
Pt refused Klor-Con 40 mEq powder but accepted PO K-Dur 40 mEq with much coaching.  Pt cleared for discharge and provided with discharge instructions.  Pt picked up by her sister.

## 2021-03-02 NOTE — ED PROVIDER NOTE - OBJECTIVE STATEMENT
This is a 50 yr old F, with no pmh and psychiatric history, with c/o   Pt bib sister Chely Bearden ( [phone]) This is a 50 yr old F, with no pmh and psychiatric history, with c/o depression, not functioning, and unintentional weight loss from last March.  Pt bib sister Chely Bearden ( [phone]), collateral info obtained by SW, refer to her note.   Pt is very poor historian, states she just needs to regain the weight and she will be fine. She came with her sister who was worried about her.   Pt lives with her 3 adult children and . She feels overwhelmed, but very vague. Denies SI/HI/AH/VH. Denies falling, punching or kicking any objects. Denies pain, SOB, fever, chills, chest and abdominal discomfort, lightheadedness. denies bowel habit changes, denies abd pain, n,v. Denies recent use of alcohol or illicit drug. No evidence of physical injuries.

## 2021-03-02 NOTE — ED BEHAVIORAL HEALTH ASSESSMENT NOTE - NSSUICPROTFACT_PSY_ALL_CORE
Responsibility to children, family, or others/Identifies reasons for living/Supportive social network of family or friends Responsibility to children, family, or others/Identifies reasons for living/Supportive social network of family or friends/Fear of death or the actual act of killing self

## 2021-03-02 NOTE — ED ADULT NURSE NOTE - OBJECTIVE STATEMENT
Pt with no prior psychiatric history brought in by her sister for paranoia, delusions refusing to eat, x 1 year.   Pt denies Si HI hallucinations, drug or alcohol use.  Pt denies there is anything wrong with her but states "I didn't used to be like this."

## 2021-03-02 NOTE — ED BEHAVIORAL HEALTH ASSESSMENT NOTE - NSSUICRSKFACTOR_PSY_ALL_CORE
Presenting Symptoms/Historical Factors/Activating Events/Stressors Presenting Symptoms/Treatment Related Factors

## 2021-03-02 NOTE — ED BEHAVIORAL HEALTH ASSESSMENT NOTE - ADDITIONAL DETAILS ALL
denies ingestion of hydrogen peroxide was a suicide attempt denies ingestion of hydrogen peroxide in Dec 2020 was a suicide attempt

## 2021-03-02 NOTE — ED PROVIDER NOTE - PATIENT PORTAL LINK FT
You can access the FollowMyHealth Patient Portal offered by [facility] by registering at the following website: [email or web address]. By joining [facility]’s FollowMyHealth portal, you will also be able to view your health information using other applications (apps) compatible with our system.

## 2021-03-02 NOTE — ED PROVIDER NOTE - CLINICAL SUMMARY MEDICAL DECISION MAKING FREE TEXT BOX
This is a 50 yr old F, with no pmh and psychiatric history, with c/o depression, not functioning, and unintentional weight loss from last March.  Pt bib sister Chely Bearden ( [phone]), collateral info obtained by SW, refer to her note.   Pt is very poor historian, states she just needs to regain the weight and she will be fine. She came with her sister who was worried about her.   Pt lives with her 3 adult children and . She feels overwhelmed, but very vague. Denies SI/HI/AH/VH. Denies falling, punching or kicking any objects. Denies pain, SOB, fever, chills, chest and abdominal discomfort, lightheadedness. denies bowel habit changes, denies abd pain, n,v. Denies recent use of alcohol or illicit drug. No evidence of physical injuries.  Labs - K 3.1 supplement given  Psych - out patient follow up.
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
## 2021-03-02 NOTE — ED ADULT NURSE NOTE - NSIMPLEMENTINTERV_GEN_ALL_ED
Implemented All Universal Safety Interventions:  [place] to call system. Call bell, personal items and telephone within reach. Instruct patient to call for assistance. Room bathroom lighting operational. Non-slip footwear when patient is off stretcher. Physically safe environment: no spills, clutter or unnecessary equipment. Stretcher in lowest position, wheels locked, appropriate side rails in place.

## 2021-03-02 NOTE — ED BEHAVIORAL HEALTH ASSESSMENT NOTE - RISK ASSESSMENT
Risk factors include paranoia, noncompliant with treatment, poor insight.  Protective factors include no suicide attempts, no violence history, no access to guns,  no substance abuse, supportive family, no suicidal ideation or homicidal ideation, identifies reasons for living.  Pt is not at acutely elevated risk of harm to self or others. Low Acute Suicide Risk

## 2021-03-03 LAB
SARS-COV-2 IGG SERPL QL IA: NEGATIVE — SIGNIFICANT CHANGE UP
SARS-COV-2 IGM SERPL IA-ACNC: 0.54 INDEX — SIGNIFICANT CHANGE UP

## 2021-09-30 NOTE — ED PROVIDER NOTE - HIV OFFER
MTM-  Can you please advise on the best agent for this patient based off of the preferred options from the prior authorization denial letter for Xarelto?  She was discharged from [facility] on Xarelto.  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      Roberta Garcia MBA, MS, PA-C     Opt out

## 2022-09-01 NOTE — PROGRESS NOTE ADULT - PROBLEM SELECTOR PLAN 2
-Psych consulted and recommending Based on her age and repeated symptoms with previous rapid resolution of symptoms it would benefit patient to have further medical work to r/o medical etiology.  -Will continue 1:1 CO
This document is complete and the patient is ready for discharge.

## 2025-02-17 NOTE — CONSULT NOTE ADULT - CONSULT REQUESTED DATE/TIME
Caller: Marques Blair    
09-Mar-2020 16:57